# Patient Record
Sex: FEMALE | Race: WHITE | NOT HISPANIC OR LATINO | Employment: FULL TIME | URBAN - METROPOLITAN AREA
[De-identification: names, ages, dates, MRNs, and addresses within clinical notes are randomized per-mention and may not be internally consistent; named-entity substitution may affect disease eponyms.]

---

## 2020-02-10 ENCOUNTER — HOSPITAL ENCOUNTER (OUTPATIENT)
Dept: GENERAL RADIOLOGY | Facility: HOSPITAL | Age: 25
Discharge: HOME | End: 2020-02-10
Attending: OBSTETRICS & GYNECOLOGY

## 2020-09-25 ENCOUNTER — HOSPITAL ENCOUNTER (OUTPATIENT)
Dept: LABOR AND DELIVERY | Facility: HOSPITAL | Age: 25
Discharge: HOME | End: 2020-09-25
Attending: OBSTETRICS & GYNECOLOGY

## 2020-11-22 LAB
EXTERNAL GROUP B STREP ANTIGEN: POSITIVE
EXTERNAL VDRL: NONREACTIVE
HIV1 P24 AG SERPL QL IA: NEGATIVE

## 2021-01-22 LAB
C TRACH RRNA SPEC DONR QL NAA+PROBE: NORMAL
EXTERNAL ABO GROUPING: NORMAL
EXTERNAL ANTIBODY SCREEN: NORMAL
EXTERNAL HEMATOCRIT: 41 %
EXTERNAL HEMOGLOBIN: 13.5 G/DL
EXTERNAL HEPATITIS B SURFACE ANTIGEN: NEGATIVE
EXTERNAL PLATELET COUNT: 258 K/ΜL
EXTERNAL RH FACTOR: POSITIVE
EXTERNAL SYPHILIS RPR SCREEN: NEGATIVE
HCV AB S/CO SERPL IA: NORMAL
N GONORRHOEA DNA SPEC QL NAA+PROBE: NORMAL
RUBV IGG SERPL IA-ACNC: NORMAL

## 2021-05-20 ENCOUNTER — HOSPITAL ENCOUNTER (OUTPATIENT)
Dept: URGENT CARE | Facility: CLINIC | Age: 26
Discharge: HOME | End: 2021-05-20
Attending: EMERGENCY MEDICINE

## 2021-08-22 ENCOUNTER — HOSPITAL ENCOUNTER (OUTPATIENT)
Facility: HOSPITAL | Age: 26
Discharge: HOME | End: 2021-08-22
Attending: ADVANCED PRACTICE MIDWIFE | Admitting: ADVANCED PRACTICE MIDWIFE

## 2021-08-22 VITALS
TEMPERATURE: 98.3 F | DIASTOLIC BLOOD PRESSURE: 61 MMHG | HEART RATE: 92 BPM | RESPIRATION RATE: 18 BRPM | SYSTOLIC BLOOD PRESSURE: 121 MMHG

## 2021-08-22 LAB — A1 MICROGLOB PLACENTAL VAG QL: NEGATIVE

## 2021-08-22 PROCEDURE — 84112 EVAL AMNIOTIC FLUID PROTEIN: CPT | Performed by: ADVANCED PRACTICE MIDWIFE

## 2021-08-22 PROCEDURE — 59025 FETAL NON-STRESS TEST: CPT

## 2021-08-22 PROCEDURE — G0463 HOSPITAL OUTPT CLINIC VISIT: HCPCS

## 2021-08-22 RX ORDER — PRENATAL VIT/IRON FUM/FOLIC AC 27MG-0.8MG
1 TABLET ORAL DAILY
COMMUNITY

## 2021-08-22 NOTE — SIGNIFICANT NOTE
/61 (BP Location: Right arm, Patient Position: Sitting)   Pulse 92   Temp 98.3 °F (36.8 °C) (Oral)   Resp 18   35w4d  Cervix closed     08/22/21 1330   Uterine Activity Assessment   Method external tocotransducer   Contraction Pattern other (see comments)  (occasional)   Uterine Resting Tone soft by palpation   Uterine Tenderness No   Fetal Assessment   Fetal Movement active   Fetal HR Assessment Method external   Fetal HR (beats/min) 150   Fetal HR Variability moderate (amplitude range 6 to 25 bpm)   Fetal HR Accelerations lasting at least 15 seconds;greater than/equal to 15 bpm   Fetal HR Decelerations absent

## 2021-08-23 NOTE — PROGRESS NOTES
ANUJA Lawson  Triage Progress Note    Chief Complaint   Patient presents with   • Leaking Fluid       Subjective     Patient is a 26 y.o. female  currently at 35w4d, who presents with complaints of PSROM and occasional contractions.       Past OB History:     OB History    Para Term  AB Living   2 1 1 0 0 1   SAB TAB Ectopic Molar Multiple Live Births   0 0 0 0 0 1      # Outcome Date GA Lbr Jt/2nd Weight Sex Delivery Anes PTL Lv   2 Current            1 Term 20 37w0d       [facility]LEY        Objective       Vital Signs Range for the last 24 hours  Temperature: Temp:  [98.3 °F (36.8 °C)] 98.3 °F (36.8 °C)   Temp Source: Temp src: Oral   BP: BP: (121)/(61) 121/61   Pulse: Heart Rate:  [92] 92   Respirations: Resp:  [18] 18   SPO2:     O2 Amount (l/min):     O2 Devices     Weight:         Presentation:    Cervix: Exam by: Method: sterile exam per RN   Dilation: Cervical Dilation (cm): 0   Effacement: Cervical Effacement: 50%   Station:         Fetal Heart Rate Assessment   Method: Fetal HR Assessment Method: external   Beats/min: Fetal HR (beats/min): 150   Baseline:     Variability: Fetal HR Variability: moderate (amplitude range 6 to 25 bpm)   Accels: Fetal HR Accelerations: lasting at least 15 seconds, greater than/equal to 15 bpm   Decels: Fetal HR Decelerations: absent   Tracing Category:       Uterine Assessment   Method: Method: external tocotransducer   Frequency (min): Irregular   Ctx Count in 10 min:     Duration:     Intensity:     Intensity by IUPC:     Resting Tone: Uterine Resting Tone: soft by palpation   Resting Tone by IUPC:     [place] Units:       Laboratory Results:    Results for orders placed or performed during the hospital encounter of 21   Rapid Assay For ROM - Amniotic Fluid, Amniotic Sac    Specimen: Amniotic Sac; Amniotic Fluid   Result Value Ref Range    Rupture of Membranes Negative Negative              Assessment/Plan       Perceived  SROM  Contractions          Plan:  1. Discharge to home.  Reactive NST, irregular contractions, closed cervix without change, and negative ROM.  2. Plan of care has been reviewed with patient  3.  Risks, benefits of treatment plan have been discussed.  4.  All questions have been answered.        Martita Bradley CNM  8/22/2021  22:13 EDT

## 2021-09-02 ENCOUNTER — HOSPITAL ENCOUNTER (OUTPATIENT)
Facility: HOSPITAL | Age: 26
Discharge: HOME | End: 2021-09-02
Attending: ADVANCED PRACTICE MIDWIFE | Admitting: OBSTETRICS & GYNECOLOGY

## 2021-09-02 VITALS
SYSTOLIC BLOOD PRESSURE: 106 MMHG | HEART RATE: 101 BPM | DIASTOLIC BLOOD PRESSURE: 69 MMHG | RESPIRATION RATE: 18 BRPM | TEMPERATURE: 98.3 F

## 2021-09-02 PROCEDURE — G0463 HOSPITAL OUTPT CLINIC VISIT: HCPCS

## 2021-09-02 NOTE — SIGNIFICANT NOTE
09/02/21 1936   Nonstress Test   Reason for NST OB Triage;Other (Comment)  (Elevated blood pressure)   Acoustic Stimulator No   Uterine Irritability No   Contractions Irregular   Fetal Assessment   Fetal Movement absent   Fetal HR Assessment Method external   Fetal HR (beats/min) 140  (140's)   Fetal Heart Baseline Rate normal range   Fetal HR Variability moderate (amplitude range 6 to 25 bpm)   Fetal HR Accelerations episodic;lasting at least 15 seconds   Fetal HR Decelerations absent   Fetal HR Tracing Category Category I   Sinusoidal Pattern Present absent   Interpretation A   Nonstress Test Interpretation A Reactive

## 2021-09-02 NOTE — SIGNIFICANT NOTE
09/02/21 1926   Nonstress Test   Reason for NST OB Triage;Other (Comment)  (ELEVATED BLOOD PRESSURE AT HOME)   Acoustic Stimulator No   Uterine Irritability No   Contractions Irregular   Fetal Assessment   Fetal Movement active   Fetal HR Assessment Method external   Fetal HR (beats/min) 130   Fetal Heart Baseline Rate normal range   Fetal HR Variability moderate (amplitude range 6 to 25 bpm)   Fetal HR Accelerations greater than/equal to 15 bpm   Fetal HR Decelerations absent   Interpretation A   Nonstress Test Interpretation A Reactive   /69 (BP Location: Right arm, Patient Position: Sitting)   Pulse 101   Temp 98.3 °F (36.8 °C) (Oral)   Resp 18   37w1d

## 2021-09-08 ENCOUNTER — ROUTINE PRENATAL (OUTPATIENT)
Dept: OBSTETRICS AND GYNECOLOGY | Facility: CLINIC | Age: 26
End: 2021-09-08

## 2021-09-08 VITALS — WEIGHT: 271 LBS | DIASTOLIC BLOOD PRESSURE: 70 MMHG | SYSTOLIC BLOOD PRESSURE: 120 MMHG

## 2021-09-08 DIAGNOSIS — O36.63X0 MACROSOMIA OF FETUS AFFECTING MANAGEMENT OF MOTHER IN THIRD TRIMESTER, SINGLE OR UNSPECIFIED FETUS: ICD-10-CM

## 2021-09-08 DIAGNOSIS — Z3A.38 38 WEEKS GESTATION OF PREGNANCY: Primary | ICD-10-CM

## 2021-09-08 LAB
GLUCOSE UR STRIP-MCNC: NEGATIVE MG/DL
PROT UR STRIP-MCNC: NEGATIVE MG/DL

## 2021-09-08 PROCEDURE — 99212 OFFICE O/P EST SF 10 MIN: CPT | Performed by: OBSTETRICS & GYNECOLOGY

## 2021-09-08 NOTE — PROGRESS NOTES
OB FOLLOW UP    CC: Scheduled OB routine FU         Prenatal care complicated by: Elevated BMI and suspected macrosomia  There is no problem list on file for this patient.      Subjective:   Patient has: No complaints, No leaking fluid, No vaginal bleeding, No contractions, Adequate FM, No HA, No scotomata or vision changes, No RUQ/epigastric pain, No nausea, No vomiting, No back pain, No UTI symptoms  COMPLAINS OF: Swelling    Objective:  Urine glucose/protein- see flow sheet      /70   Wt 123 kg (271 lb)   See OB flow for LE edema, and cvx exam if applicable  FHT: 158 BPM   Uterine Size: 40 cm      Assessment and Plan:  Diagnoses and all orders for this visit:    1. 38 weeks gestation of pregnancy (Primary)  -     Cancel: POC Urinalysis Dipstick  -     POC Urinalysis Dipstick    2. Macrosomia of fetus affecting management of mother in third trimester, single or unspecified fetus  Comments:  Discussed IOL at 39+ weeks. Pt wishes to proceed    3. Obesity complicating childbirth  Comments:  Delivery at 39+ weeks          38w0d  Reassuring pregnancy progress    Counseling: OB precautions, leaking, VB, beth rai vs PTL/Labor, FKC, HTN precautions, HA, vision change, RUQ/epigastric pain, edema    Questions answered    Return in about 1 week (around 9/15/2021).      Dylan Deleon MD  09/08/2021

## 2021-09-10 ENCOUNTER — TRANSCRIBE ORDERS (OUTPATIENT)
Dept: LAB | Facility: HOSPITAL | Age: 26
End: 2021-09-10

## 2021-09-10 ENCOUNTER — LAB (OUTPATIENT)
Dept: LAB | Facility: HOSPITAL | Age: 26
End: 2021-09-10

## 2021-09-10 DIAGNOSIS — Z01.818 PREOP TESTING: ICD-10-CM

## 2021-09-10 DIAGNOSIS — Z01.818 PREOP TESTING: Primary | ICD-10-CM

## 2021-09-10 PROCEDURE — U0004 COV-19 TEST NON-CDC HGH THRU: HCPCS

## 2021-09-10 PROCEDURE — C9803 HOPD COVID-19 SPEC COLLECT: HCPCS

## 2021-09-11 LAB — SARS-COV-2 RNA NOSE QL NAA+PROBE: NOT DETECTED

## 2021-09-13 VITALS — BODY MASS INDEX: 43.74 KG/M2 | HEIGHT: 66 IN

## 2021-09-14 ENCOUNTER — ROUTINE PRENATAL (OUTPATIENT)
Dept: OBSTETRICS AND GYNECOLOGY | Facility: CLINIC | Age: 26
End: 2021-09-14

## 2021-09-14 VITALS — SYSTOLIC BLOOD PRESSURE: 124 MMHG | WEIGHT: 270 LBS | DIASTOLIC BLOOD PRESSURE: 84 MMHG | BODY MASS INDEX: 43.58 KG/M2

## 2021-09-14 DIAGNOSIS — O99.210 OBESITY IN PREGNANCY, ANTEPARTUM: ICD-10-CM

## 2021-09-14 DIAGNOSIS — O09.93 SUPERVISION OF HIGH RISK PREGNANCY IN THIRD TRIMESTER: Primary | ICD-10-CM

## 2021-09-14 DIAGNOSIS — O36.63X0 MACROSOMIA OF FETUS AFFECTING MANAGEMENT OF MOTHER IN THIRD TRIMESTER, SINGLE OR UNSPECIFIED FETUS: ICD-10-CM

## 2021-09-14 PROBLEM — Z34.80 SUPERVISION OF OTHER NORMAL PREGNANCY, ANTEPARTUM: Status: ACTIVE | Noted: 2021-09-14

## 2021-09-14 LAB
GLUCOSE UR STRIP-MCNC: NEGATIVE MG/DL
PROT UR STRIP-MCNC: NEGATIVE MG/DL

## 2021-09-14 PROCEDURE — 99212 OFFICE O/P EST SF 10 MIN: CPT | Performed by: OBSTETRICS & GYNECOLOGY

## 2021-09-14 RX ORDER — NORGESTIMATE AND ETHINYL ESTRADIOL 0.25-0.035
KIT ORAL
Status: ON HOLD | COMMUNITY
End: 2021-09-16

## 2021-09-14 NOTE — PROGRESS NOTES
OB FOLLOW UP    CC: Scheduled OB routine FU       Prenatal care complicated by: Elevated BMI and LGA  Patient Active Problem List   Diagnosis   • Supervision of other normal pregnancy, antepartum   • Macrosomia affecting management of mother in third trimester   • Obesity in pregnancy, antepartum       Subjective:   Patient has: No complaints, No leaking fluid, No vaginal bleeding, Adequate FM, No HA, No scotomata or vision changes, No RUQ/epigastric pain, No nausea, No vomiting, No back pain, No UTI symptoms, Complains of some irregular contractions and some lower extremity swelling  Desires to proceed with induction of labor on Thursday    Objective:  Urine glucose/protein- see flow sheet      /84   Wt 122 kg (270 lb)   BMI 43.58 kg/m²   See OB flow for LE edema, and cvx exam if applicable  FHT: 130 BPM   Uterine Size: 40.5 cm      Assessment and Plan:  Diagnoses and all orders for this visit:    1. Supervision of high risk pregnancy in third trimester (Primary)  Comments:  Labor instructions, fetal kick counts, OB warnings  Anticipatory guidance  Orders:  -     POC Urinalysis Dipstick    2. Macrosomia of fetus affecting management of mother in third trimester, single or unspecified fetus  Comments:  Estimated fetal weight is approximately 9 pounds by ultrasound 36 weeks  Plan induction of labor at 39 weeks  Dystocia risks discussed    3. Obesity in pregnancy, antepartum  Comments:  Continue to monitor weight        38w6d  Reassuring pregnancy progress    Counseling: OB precautions, leaking, VB, beth rai vs PTL/Labor, FKC  The risks, benefits, and alternatives of induction of labor have been discussed the patient, including the risk of failed induction of labor, an increased risk of  delivery, and increased risk of fetal heart rate problems during the induction that may lead to emergent  delivery.  We have discussed the risks of medication use for induction and augmentation of labor  including prostaglandins, such as Cytotec and other agents such as oxytocin.  We have discussed that drugs such as this have warnings for use in pregnancy, however, have long established safety and efficacy for induction of labor or augmentation of labor when used appropriately.  We have discussed the use of a cervical ripening balloon for induction of labor and/or cervical ripening.  We have discussed the risks, benefits, and alternatives to this method of induction of labor we've also discussed that [employer] College of obstetrics and gyne[employer] endorses the use of drugs such as these for induction of labor and augmentation of labor.  The patient expressed her understanding of these risks and wishes to proceed.  We have also discussed specifically the risk of shoulder dystocia given the suspected large size of the baby.  We discussed that shoulder dystocia could lead to permanent fetal and maternal injuries including fetal death.  We discussed the possible fetal injuries including brachial plexus injury, permanent disability, cerebral palsy etc.  We discussed that all shoulder dystocia not predictable.  The patient does wish to attempt vaginal delivery and has declined attempted primary  delivery.    Questions answered  Follow-up after delivery    Dylan Deleon MD  2021

## 2021-09-16 ENCOUNTER — HOSPITAL ENCOUNTER (INPATIENT)
Facility: HOSPITAL | Age: 26
LOS: 2 days | Discharge: HOME | End: 2021-09-18
Attending: OBSTETRICS & GYNECOLOGY | Admitting: OBSTETRICS & GYNECOLOGY

## 2021-09-16 ENCOUNTER — ANESTHESIA EVENT (OUTPATIENT)
Dept: LABOR AND DELIVERY | Facility: HOSPITAL | Age: 26
End: 2021-09-16

## 2021-09-16 ENCOUNTER — ANESTHESIA (OUTPATIENT)
Dept: LABOR AND DELIVERY | Facility: HOSPITAL | Age: 26
End: 2021-09-16

## 2021-09-16 ENCOUNTER — HOSPITAL ENCOUNTER (OUTPATIENT)
Dept: LABOR AND DELIVERY | Facility: HOSPITAL | Age: 26
Discharge: HOME | End: 2021-09-16

## 2021-09-16 PROBLEM — Z34.80 SUPERVISION OF OTHER NORMAL PREGNANCY, ANTEPARTUM: Status: RESOLVED | Noted: 2021-09-14 | Resolved: 2021-09-16

## 2021-09-16 PROBLEM — O99.820 GBS (GROUP B STREPTOCOCCUS CARRIER), +RV CULTURE, CURRENTLY PREGNANT: Status: ACTIVE | Noted: 2021-09-16

## 2021-09-16 PROBLEM — Z34.90 ENCOUNTER FOR INDUCTION OF LABOR: Status: ACTIVE | Noted: 2021-09-16

## 2021-09-16 LAB
ABO GROUP BLD: NORMAL
ABO GROUP BLD: NORMAL
BASE EXCESS BLDCOV CALC-SCNC: -3 MMOL/L
BASOPHILS # BLD AUTO: 0.02 10*3/MM3 (ref 0–0.2)
BASOPHILS NFR BLD AUTO: 0.2 % (ref 0–1.5)
BLD GP AB SCN SERPL QL: NEGATIVE
DEPRECATED RDW RBC AUTO: 43.3 FL (ref 37–54)
EOSINOPHIL # BLD AUTO: 0.16 10*3/MM3 (ref 0–0.4)
EOSINOPHIL NFR BLD AUTO: 1.9 % (ref 0.3–6.2)
ERYTHROCYTE [DISTWIDTH] IN BLOOD BY AUTOMATED COUNT: 13.7 % (ref 12.3–15.4)
HCO3 BLDCOV-SCNC: 23 MMOL/L
HCT VFR BLD AUTO: 34 % (ref 34–46.6)
HGB BLD-MCNC: 11.5 G/DL (ref 12–15.9)
IMM GRANULOCYTES # BLD AUTO: 0.05 10*3/MM3 (ref 0–0.05)
IMM GRANULOCYTES NFR BLD AUTO: 0.6 % (ref 0–0.5)
LYMPHOCYTES # BLD AUTO: 1.38 10*3/MM3 (ref 0.7–3.1)
LYMPHOCYTES NFR BLD AUTO: 16.5 % (ref 19.6–45.3)
MCH RBC QN AUTO: 29.6 PG (ref 26.6–33)
MCHC RBC AUTO-ENTMCNC: 33.8 G/DL (ref 31.5–35.7)
MCV RBC AUTO: 87.6 FL (ref 79–97)
MONOCYTES # BLD AUTO: 0.58 10*3/MM3 (ref 0.1–0.9)
MONOCYTES NFR BLD AUTO: 6.9 % (ref 5–12)
NEUTROPHILS NFR BLD AUTO: 6.19 10*3/MM3 (ref 1.7–7)
NEUTROPHILS NFR BLD AUTO: 73.9 % (ref 42.7–76)
NRBC BLD AUTO-RTO: 0 /100 WBC (ref 0–0.2)
PCO2 BLDCOV: 44.6 MM HG (ref 28–40)
PH BLDCOV: 7.33 PH UNITS (ref 7.31–7.37)
PLATELET # BLD AUTO: 173 10*3/MM3 (ref 140–450)
PMV BLD AUTO: 10.1 FL (ref 6–12)
PO2 BLDCOV: <40.5 MM HG (ref 21–31)
RBC # BLD AUTO: 3.88 10*6/MM3 (ref 3.77–5.28)
RH BLD: POSITIVE
RH BLD: POSITIVE
T&S EXPIRATION DATE: NORMAL
WBC # BLD AUTO: 8.38 10*3/MM3 (ref 3.4–10.8)

## 2021-09-16 PROCEDURE — 86900 BLOOD TYPING SEROLOGIC ABO: CPT | Performed by: OBSTETRICS & GYNECOLOGY

## 2021-09-16 PROCEDURE — 25010000002 FENTANYL CITRATE (PF) 50 MCG/ML SOLUTION: Performed by: STUDENT IN AN ORGANIZED HEALTH CARE EDUCATION/TRAINING PROGRAM

## 2021-09-16 PROCEDURE — 25010000002 PENICILLIN G POTASSIUM PER 600000 UNITS: Performed by: OBSTETRICS & GYNECOLOGY

## 2021-09-16 PROCEDURE — S0260 H&P FOR SURGERY: HCPCS | Performed by: OBSTETRICS & GYNECOLOGY

## 2021-09-16 PROCEDURE — C1755 CATHETER, INTRASPINAL: HCPCS | Performed by: STUDENT IN AN ORGANIZED HEALTH CARE EDUCATION/TRAINING PROGRAM

## 2021-09-16 PROCEDURE — 85025 COMPLETE CBC W/AUTO DIFF WBC: CPT | Performed by: OBSTETRICS & GYNECOLOGY

## 2021-09-16 PROCEDURE — 86901 BLOOD TYPING SEROLOGIC RH(D): CPT | Performed by: OBSTETRICS & GYNECOLOGY

## 2021-09-16 PROCEDURE — 51702 INSERT TEMP BLADDER CATH: CPT

## 2021-09-16 PROCEDURE — 25010000002 FENTANYL CITRATE (PF) 50 MCG/ML SOLUTION

## 2021-09-16 PROCEDURE — 86900 BLOOD TYPING SEROLOGIC ABO: CPT

## 2021-09-16 PROCEDURE — 51701 INSERT BLADDER CATHETER: CPT

## 2021-09-16 PROCEDURE — 51703 INSERT BLADDER CATH COMPLEX: CPT

## 2021-09-16 PROCEDURE — 59410 OBSTETRICAL CARE: CPT | Performed by: OBSTETRICS & GYNECOLOGY

## 2021-09-16 PROCEDURE — 0KQM0ZZ REPAIR PERINEUM MUSCLE, OPEN APPROACH: ICD-10-PCS | Performed by: OBSTETRICS & GYNECOLOGY

## 2021-09-16 PROCEDURE — 0UQJXZZ REPAIR CLITORIS, EXTERNAL APPROACH: ICD-10-PCS | Performed by: OBSTETRICS & GYNECOLOGY

## 2021-09-16 PROCEDURE — 82803 BLOOD GASES ANY COMBINATION: CPT | Performed by: OBSTETRICS & GYNECOLOGY

## 2021-09-16 PROCEDURE — 86850 RBC ANTIBODY SCREEN: CPT | Performed by: OBSTETRICS & GYNECOLOGY

## 2021-09-16 PROCEDURE — 86901 BLOOD TYPING SEROLOGIC RH(D): CPT

## 2021-09-16 PROCEDURE — 25010000002 ROPIVACAINE PER 1 MG

## 2021-09-16 RX ORDER — METOCLOPRAMIDE HYDROCHLORIDE 5 MG/ML
10 INJECTION INTRAMUSCULAR; INTRAVENOUS
Status: DISCONTINUED | OUTPATIENT
Start: 2021-09-16 | End: 2021-09-16 | Stop reason: HOSPADM

## 2021-09-16 RX ORDER — IBUPROFEN 600 MG/1
600 TABLET ORAL EVERY 6 HOURS PRN
Qty: 60 TABLET | Refills: 1 | Status: SHIPPED | OUTPATIENT
Start: 2021-09-16

## 2021-09-16 RX ORDER — LIDOCAINE HYDROCHLORIDE AND EPINEPHRINE 15; 5 MG/ML; UG/ML
INJECTION, SOLUTION EPIDURAL
Status: COMPLETED | OUTPATIENT
Start: 2021-09-16 | End: 2021-09-16

## 2021-09-16 RX ORDER — FAMOTIDINE 10 MG/ML
20 INJECTION, SOLUTION INTRAVENOUS
Status: DISCONTINUED | OUTPATIENT
Start: 2021-09-16 | End: 2021-09-16 | Stop reason: HOSPADM

## 2021-09-16 RX ORDER — FENTANYL CITRATE 50 UG/ML
INJECTION, SOLUTION INTRAMUSCULAR; INTRAVENOUS
Status: COMPLETED | OUTPATIENT
Start: 2021-09-16 | End: 2021-09-16

## 2021-09-16 RX ORDER — DOCUSATE SODIUM 100 MG/1
100 CAPSULE, LIQUID FILLED ORAL 2 TIMES DAILY
Qty: 60 CAPSULE | Refills: 1 | Status: SHIPPED | OUTPATIENT
Start: 2021-09-16 | End: 2021-10-26

## 2021-09-16 RX ORDER — MISOPROSTOL 200 UG/1
800 TABLET ORAL AS NEEDED
Status: DISCONTINUED | OUTPATIENT
Start: 2021-09-16 | End: 2021-09-16 | Stop reason: HOSPADM

## 2021-09-16 RX ORDER — ONDANSETRON 4 MG/1
4 TABLET, FILM COATED ORAL EVERY 6 HOURS PRN
Status: DISCONTINUED | OUTPATIENT
Start: 2021-09-16 | End: 2021-09-18 | Stop reason: HOSPADM

## 2021-09-16 RX ORDER — OXYTOCIN-SODIUM CHLORIDE 0.9% IV SOLN 30 UNIT/500ML 30-0.9/5 UT/ML-%
125 SOLUTION INTRAVENOUS ONCE
Status: COMPLETED | OUTPATIENT
Start: 2021-09-16 | End: 2021-09-16

## 2021-09-16 RX ORDER — TRISODIUM CITRATE DIHYDRATE AND CITRIC ACID MONOHYDRATE 500; 334 MG/5ML; MG/5ML
30 SOLUTION ORAL ONCE AS NEEDED
Status: DISCONTINUED | OUTPATIENT
Start: 2021-09-16 | End: 2021-09-16 | Stop reason: HOSPADM

## 2021-09-16 RX ORDER — ONDANSETRON 4 MG/1
4 TABLET, FILM COATED ORAL EVERY 6 HOURS PRN
Status: DISCONTINUED | OUTPATIENT
Start: 2021-09-16 | End: 2021-09-16 | Stop reason: HOSPADM

## 2021-09-16 RX ORDER — MORPHINE SULFATE 2 MG/ML
2 INJECTION, SOLUTION INTRAMUSCULAR; INTRAVENOUS
Status: DISCONTINUED | OUTPATIENT
Start: 2021-09-16 | End: 2021-09-16 | Stop reason: HOSPADM

## 2021-09-16 RX ORDER — HYDROCODONE BITARTRATE AND ACETAMINOPHEN 5; 325 MG/1; MG/1
1 TABLET ORAL EVERY 6 HOURS PRN
Qty: 8 TABLET | Refills: 0 | Status: SHIPPED | OUTPATIENT
Start: 2021-09-16 | End: 2021-10-26

## 2021-09-16 RX ORDER — SODIUM CHLORIDE 0.9 % (FLUSH) 0.9 %
1-10 SYRINGE (ML) INJECTION AS NEEDED
Status: DISCONTINUED | OUTPATIENT
Start: 2021-09-16 | End: 2021-09-18 | Stop reason: HOSPADM

## 2021-09-16 RX ORDER — SODIUM CHLORIDE 0.9 % (FLUSH) 0.9 %
10 SYRINGE (ML) INJECTION AS NEEDED
Status: DISCONTINUED | OUTPATIENT
Start: 2021-09-16 | End: 2021-09-16 | Stop reason: HOSPADM

## 2021-09-16 RX ORDER — IBUPROFEN 600 MG/1
600 TABLET ORAL EVERY 6 HOURS PRN
Status: DISCONTINUED | OUTPATIENT
Start: 2021-09-16 | End: 2021-09-18 | Stop reason: HOSPADM

## 2021-09-16 RX ORDER — BISACODYL 10 MG
10 SUPPOSITORY, RECTAL RECTAL DAILY PRN
Status: DISCONTINUED | OUTPATIENT
Start: 2021-09-17 | End: 2021-09-18 | Stop reason: HOSPADM

## 2021-09-16 RX ORDER — DOCUSATE SODIUM 100 MG/1
100 CAPSULE, LIQUID FILLED ORAL 2 TIMES DAILY
Status: DISCONTINUED | OUTPATIENT
Start: 2021-09-16 | End: 2021-09-18 | Stop reason: HOSPADM

## 2021-09-16 RX ORDER — TERBUTALINE SULFATE 1 MG/ML
0.25 INJECTION, SOLUTION SUBCUTANEOUS AS NEEDED
Status: DISCONTINUED | OUTPATIENT
Start: 2021-09-16 | End: 2021-09-16 | Stop reason: HOSPADM

## 2021-09-16 RX ORDER — OXYTOCIN-SODIUM CHLORIDE 0.9% IV SOLN 30 UNIT/500ML 30-0.9/5 UT/ML-%
125 SOLUTION INTRAVENOUS ONCE
Status: DISCONTINUED | OUTPATIENT
Start: 2021-09-16 | End: 2021-09-18 | Stop reason: HOSPADM

## 2021-09-16 RX ORDER — ONDANSETRON 2 MG/ML
4 INJECTION INTRAMUSCULAR; INTRAVENOUS EVERY 6 HOURS PRN
Status: DISCONTINUED | OUTPATIENT
Start: 2021-09-16 | End: 2021-09-16 | Stop reason: HOSPADM

## 2021-09-16 RX ORDER — FENTANYL CITRATE 50 UG/ML
INJECTION, SOLUTION INTRAMUSCULAR; INTRAVENOUS
Status: COMPLETED
Start: 2021-09-16 | End: 2021-09-16

## 2021-09-16 RX ORDER — HYDROCODONE BITARTRATE AND ACETAMINOPHEN 5; 325 MG/1; MG/1
2 TABLET ORAL EVERY 4 HOURS PRN
Status: DISCONTINUED | OUTPATIENT
Start: 2021-09-16 | End: 2021-09-16 | Stop reason: HOSPADM

## 2021-09-16 RX ORDER — SODIUM CHLORIDE, SODIUM LACTATE, POTASSIUM CHLORIDE, CALCIUM CHLORIDE 600; 310; 30; 20 MG/100ML; MG/100ML; MG/100ML; MG/100ML
125 INJECTION, SOLUTION INTRAVENOUS CONTINUOUS
Status: DISCONTINUED | OUTPATIENT
Start: 2021-09-16 | End: 2021-09-16

## 2021-09-16 RX ORDER — HYDROCODONE BITARTRATE AND ACETAMINOPHEN 5; 325 MG/1; MG/1
1 TABLET ORAL EVERY 4 HOURS PRN
Status: DISCONTINUED | OUTPATIENT
Start: 2021-09-16 | End: 2021-09-18 | Stop reason: HOSPADM

## 2021-09-16 RX ORDER — CEFAZOLIN SODIUM IN 0.9 % NACL 3 G/100 ML
3 INTRAVENOUS SOLUTION, PIGGYBACK (ML) INTRAVENOUS
Status: DISCONTINUED | OUTPATIENT
Start: 2021-09-16 | End: 2021-09-16 | Stop reason: HOSPADM

## 2021-09-16 RX ORDER — LIDOCAINE HYDROCHLORIDE 10 MG/ML
5 INJECTION, SOLUTION EPIDURAL; INFILTRATION; INTRACAUDAL; PERINEURAL AS NEEDED
Status: DISCONTINUED | OUTPATIENT
Start: 2021-09-16 | End: 2021-09-16 | Stop reason: HOSPADM

## 2021-09-16 RX ORDER — METHYLERGONOVINE MALEATE 0.2 MG/ML
200 INJECTION INTRAVENOUS ONCE AS NEEDED
Status: DISCONTINUED | OUTPATIENT
Start: 2021-09-16 | End: 2021-09-16 | Stop reason: HOSPADM

## 2021-09-16 RX ORDER — TRISODIUM CITRATE DIHYDRATE AND CITRIC ACID MONOHYDRATE 500; 334 MG/5ML; MG/5ML
30 SOLUTION ORAL
Status: DISCONTINUED | OUTPATIENT
Start: 2021-09-16 | End: 2021-09-16 | Stop reason: HOSPADM

## 2021-09-16 RX ORDER — IBUPROFEN 600 MG/1
600 TABLET ORAL EVERY 6 HOURS PRN
Status: DISCONTINUED | OUTPATIENT
Start: 2021-09-16 | End: 2021-09-16 | Stop reason: HOSPADM

## 2021-09-16 RX ORDER — CALCIUM CARBONATE 200(500)MG
2 TABLET,CHEWABLE ORAL 3 TIMES DAILY PRN
Status: DISCONTINUED | OUTPATIENT
Start: 2021-09-16 | End: 2021-09-18 | Stop reason: HOSPADM

## 2021-09-16 RX ORDER — HYDROCODONE BITARTRATE AND ACETAMINOPHEN 5; 325 MG/1; MG/1
1 TABLET ORAL EVERY 4 HOURS PRN
Status: DISCONTINUED | OUTPATIENT
Start: 2021-09-16 | End: 2021-09-16 | Stop reason: HOSPADM

## 2021-09-16 RX ORDER — HYDROCODONE BITARTRATE AND ACETAMINOPHEN 5; 325 MG/1; MG/1
2 TABLET ORAL EVERY 4 HOURS PRN
Status: DISCONTINUED | OUTPATIENT
Start: 2021-09-16 | End: 2021-09-18 | Stop reason: HOSPADM

## 2021-09-16 RX ORDER — ACETAMINOPHEN 325 MG/1
650 TABLET ORAL EVERY 4 HOURS PRN
Status: DISCONTINUED | OUTPATIENT
Start: 2021-09-16 | End: 2021-09-16 | Stop reason: HOSPADM

## 2021-09-16 RX ORDER — CARBOPROST TROMETHAMINE 250 UG/ML
250 INJECTION, SOLUTION INTRAMUSCULAR AS NEEDED
Status: DISCONTINUED | OUTPATIENT
Start: 2021-09-16 | End: 2021-09-16 | Stop reason: HOSPADM

## 2021-09-16 RX ORDER — PRENATAL WITH FERROUS FUM AND FOLIC ACID 3080; 920; 120; 400; 22; 1.84; 3; 20; 10; 1; 12; 200; 27; 25; 2 [IU]/1; [IU]/1; MG/1; [IU]/1; MG/1; MG/1; MG/1; MG/1; MG/1; MG/1; UG/1; MG/1; MG/1; MG/1; MG/1
1 TABLET ORAL DAILY
Status: DISCONTINUED | OUTPATIENT
Start: 2021-09-17 | End: 2021-09-18 | Stop reason: HOSPADM

## 2021-09-16 RX ORDER — SODIUM CHLORIDE 0.9 % (FLUSH) 0.9 %
3 SYRINGE (ML) INJECTION EVERY 12 HOURS SCHEDULED
Status: DISCONTINUED | OUTPATIENT
Start: 2021-09-16 | End: 2021-09-16 | Stop reason: HOSPADM

## 2021-09-16 RX ORDER — OXYTOCIN-SODIUM CHLORIDE 0.9% IV SOLN 30 UNIT/500ML 30-0.9/5 UT/ML-%
2-20 SOLUTION INTRAVENOUS
Status: DISCONTINUED | OUTPATIENT
Start: 2021-09-16 | End: 2021-09-16 | Stop reason: HOSPADM

## 2021-09-16 RX ORDER — EPHEDRINE SULFATE 50 MG/ML
5 INJECTION, SOLUTION INTRAVENOUS
Status: DISCONTINUED | OUTPATIENT
Start: 2021-09-16 | End: 2021-09-16 | Stop reason: HOSPADM

## 2021-09-16 RX ADMIN — FENTANYL CITRATE 100 MCG: 50 INJECTION INTRAMUSCULAR; INTRAVENOUS at 13:47

## 2021-09-16 RX ADMIN — PENICILLIN G POTASSIUM 2.5 MILLION UNITS: 5000000 INJECTION, POWDER, FOR SOLUTION INTRAMUSCULAR; INTRAVENOUS at 14:00

## 2021-09-16 RX ADMIN — PENICILLIN G POTASSIUM 5 MILLION UNITS: 5000000 INJECTION, POWDER, FOR SOLUTION INTRAMUSCULAR; INTRAVENOUS at 09:27

## 2021-09-16 RX ADMIN — IBUPROFEN 600 MG: 600 TABLET ORAL at 23:10

## 2021-09-16 RX ADMIN — SODIUM CHLORIDE, POTASSIUM CHLORIDE, SODIUM LACTATE AND CALCIUM CHLORIDE 125 ML/HR: 600; 310; 30; 20 INJECTION, SOLUTION INTRAVENOUS at 08:43

## 2021-09-16 RX ADMIN — EPHEDRINE SULFATE 5 MG: 50 INJECTION INTRAVENOUS at 14:56

## 2021-09-16 RX ADMIN — WITCH HAZEL 1 PAD: 500 SOLUTION RECTAL; TOPICAL at 20:05

## 2021-09-16 RX ADMIN — DOCUSATE SODIUM 100 MG: 100 CAPSULE, LIQUID FILLED ORAL at 21:36

## 2021-09-16 RX ADMIN — Medication: at 20:05

## 2021-09-16 RX ADMIN — OXYTOCIN 2 MILLI-UNITS/MIN: 10 INJECTION, SOLUTION INTRAMUSCULAR; INTRAVENOUS at 08:43

## 2021-09-16 RX ADMIN — OXYTOCIN 125 ML/HR: 10 INJECTION, SOLUTION INTRAMUSCULAR; INTRAVENOUS at 16:50

## 2021-09-16 RX ADMIN — LIDOCAINE HYDROCHLORIDE AND EPINEPHRINE 3 ML: 15; 5 INJECTION, SOLUTION EPIDURAL at 13:47

## 2021-09-16 RX ADMIN — SODIUM CHLORIDE, POTASSIUM CHLORIDE, SODIUM LACTATE AND CALCIUM CHLORIDE 125 ML/HR: 600; 310; 30; 20 INJECTION, SOLUTION INTRAVENOUS at 12:07

## 2021-09-16 NOTE — ANESTHESIA PROCEDURE NOTES
Labor Epidural      Patient reassessed immediately prior to procedure    Patient location during procedure: OB  Start Time: 9/16/2021 1:05 PM  Stop Time: 9/16/2021 1:50 PM  Performed By  Anesthesiologist: Elinor Da Silva DO  Preanesthetic Checklist  Completed: patient identified, IV checked, risks and benefits discussed, surgical consent, monitors and equipment checked, pre-op evaluation and timeout performed  Additional Notes  Landmarks were difficult to determine due to patient's body habitus. Several attempts made. I asked a second provider, Dr. Goldberg, to help, who ultimately successfully placed the epidural.   Prep:  Pt Position:sitting  Sterile Tech:cap, gloves, sterile barrier, mask and gown  Prep:chlorhexidine gluconate and isopropyl alcohol  Monitoring:blood pressure monitoring, continuous pulse oximetry and EKG  Epidural Block Procedure:  Approach:midline  Guidance:landmark technique and palpation technique  Location:L3-L4  Needle Type:Tuohy  Needle Gauge:17 G  Loss of Resistance Medium: air  Loss of Resistance: 12cm  Cath Depth at skin:10 cm  Paresthesia: none  Aspiration:negative  Test Dose:negative  Test dose medication: fentaNYL citrate (PF) (SUBLIMAZE) injection, 100 mcg  lidocaine 1.5%-EPINEPHrine 1:200,000 (XYLOCAINE W/EPI) injection, 3 mL  Med administered at 9/16/2021 1:47 PM  Number of Attempts: 1 (5)  Post Assessment:  Dressing:occlusive dressing applied and secured with tape  Pt Tolerance:patient tolerated the procedure well with no apparent complications  Complications:no

## 2021-09-16 NOTE — DISCHARGE SUMMARY
[facility]         DISCHARGE SUMMARY    Patient Name: Solange Guzmán  : 1995  MRN: [id number]    Date of Admission: 2021  Date of Discharge: 2021   Primary Care Physician: Raymundo David MD    Consults     No orders found from 2021 to 2021.           Procedures:  Spontaneous vaginal delivery    Presenting Problem:   Macrosomia of fetus affecting management of mother in third trimester, single or unspecified fetus [O36.63X0]  Spontaneous vaginal delivery [O80]    Admitting Diagnosis:  26-year-old  2 para 1 at 39 weeks and 1 days gestation  Suspected macrosomia  Obesity  GBS positive  Induction of labor    Discharge Diagnosis:  26-year-old  2 para 1 at 39 weeks and 1 days gestation  Suspected macrosomia  Obesity  GBS positive  Induction of labor  Spontaneous vaginal delivery  Second-degree midline laceration and periclitoral laceration    Delivery Summary     OB Surgeon: Dylan Deleon MD   Anesthesia: Epidural  Delivery Type:   Perineum: OBPERINEUM: 2nd degree laceration, Periclitoral laceration  Feeding method: Breast    Infant: female  infant;    Weight: 4190 g (9 lb 3.8 oz)     APGARS: 8  @ 1 minute / 9  @ 5 minutes   Venous Blood Gas:   pH, Cord Venous   Date Value Ref Range Status   2021 7.330 7.310 - 7.370 pH Units Final      Arterial Blood Gas: No results found for: [facility]     Hospital Course     Hospital Course:  Solange Guzmán is a 26 y.o.  39w1d who presented on 2021 for induction of labor secondary to maternal obesity and and suspected macrosomia.  Her induction of labor was carried out with low-dose oxytocin and a cervical ripening balloon.  Her induction proceeded well.  She had an uncomplicated vaginal delivery on 2021.  After initial recovery in the PACU she was transferred to postpartum unit for further postpartum care.  She did well postpartum by postpartum day 2-day of discharge, she was afebrile  stable vital signs throughout the hospital stay.  She was tolerating a regular diet.  She was ambulating without difficulty.  She had normal bowel function.  Her pain was controlled.  She was breast-feeding her infant without difficulty.  Infant was doing well.  She desired discharge home. Discharge Saturday, post partum day 2    Day of Discharge     Vital Signs:  Temp:  [97.7 °F (36.5 °C)-98.7 °F (37.1 °C)] 97.7 °F (36.5 °C)  Heart Rate:  [] 183  Resp:  [18] 18  BP: ()/() 93/36    Pertinent  and/or Most Recent Results     LAB RESULTS:       Lab 09/16/21  1100   WBC 8.38   HEMOGLOBIN 11.5*   HEMATOCRIT 34.0   PLATELETS 173   NEUTROS ABS 6.19   IMMATURE GRANS (ABS) 0.05   LYMPHS ABS 1.38   MONOS ABS 0.58   EOS ABS 0.16   MCV 87.6                 Lab 09/16/21  0950 09/16/21  0844 09/16/21  0844   ABO TYPING A   < > A   RH TYPING Positive   < > Positive   ANTIBODY SCREEN  --   --  Negative    < > = values in this interval not displayed.     URINALYSIS@  Microbiology Results (last 10 days)     Procedure Component Value - Date/Time    COVID PRE-OP / PRE-PROCEDURE SCREENING ORDER (NO ISOLATION) - Swab, Nasopharynx [[id number]] Collected: 09/10/21 1140    Lab Status: Final result Specimen: Swab from Nasopharynx Updated: 09/11/21 1800    Narrative:      The following orders were created for panel order COVID PRE-OP / PRE-PROCEDURE SCREENING ORDER (NO ISOLATION) - Swab, Nasopharynx.  Procedure                               Abnormality         Status                     ---------                               -----------         ------                     COVID-19 PCR, [facility]...[[id number]]                      Final result                 Please view results for these tests on the individual orders.    COVID-19 PCR, [facility], NP SWAB IN [facility] VIRAL TRANSPORT MEDIA/ORAL SWISH 24-30 HR TAT - Swab, Nasopharynx [[id number]] Collected: 09/10/21 1140    Lab Status: Final result Specimen: Swab from Nasopharynx  Updated: 09/11/21 1800     SARS-CoV-2 MYRNA Not Detected             Discharge Details        Discharge Medications      New Medications      Instructions Start Date   docusate sodium 100 MG capsule  Commonly known as: Colace   100 mg, Oral, 2 Times Daily      HYDROcodone-acetaminophen 5-325 MG per tablet  Commonly known as: NORCO   1 tablet, Oral, Every 6 Hours PRN      ibuprofen 600 MG tablet  Commonly known as: ADVIL,MOTRIN   600 mg, Oral, Every 6 Hours PRN         Continue These Medications      Instructions Start Date   prenatal vitamin 27-0.8 27-0.8 MG tablet tablet   1 tablet, Oral, Daily             No Known Allergies    Discharge Disposition:   Home, self-care    Discharge Condition:  Good    Diet:   Regular    Discharge Activity:   Gradually resume normal activity  Pelvic rest, nothing in the vagina, no tampons, no douching, and no intercourse for 6 weeks.    Follow Up:  5 weeks with Dr. Deleon    Electronically signed by Dylan Deleon MD, 09/16/21, 6:07 PM EDT.

## 2021-09-16 NOTE — ANESTHESIA PREPROCEDURE EVALUATION
Anesthesia Evaluation     Patient summary reviewed and Nursing notes reviewed   no history of anesthetic complications:  NPO Solid Status: > 8 hours  NPO Liquid Status: > 2 hours           Airway   Mallampati: II  TM distance: >3 FB  Neck ROM: full  Possible difficult intubation  Dental - normal exam     Pulmonary - negative pulmonary ROS and normal exam   (-) not a smoker  Cardiovascular - negative cardio ROS and normal exam  Exercise tolerance: good (4-7 METS)        Neuro/Psych  (+) numbness,       ROS Comment: Ulnar nerve repair  GI/Hepatic/Renal/Endo    (+) obesity, morbid obesity, GERD well controlled,      Musculoskeletal (-) negative ROS        ROS comment: Right upper extremity pain  Abdominal   (+) obese,     Bowel sounds: normal.   Substance History - negative use     OB/GYN    (+) Pregnant,         Other - negative ROS       ROS/Med Hx Other: macrosomic fetus      Phys Exam Other: Gravid uterus                Anesthesia Plan    ASA 3     epidural       Anesthetic plan, all risks, benefits, and alternatives have been provided, discussed and informed consent has been obtained with: patient.

## 2021-09-16 NOTE — PLAN OF CARE
Problem: Adult Inpatient Plan of Care  Goal: Plan of Care Review  Outcome: Ongoing, Progressing  Flowsheets (Taken 9/16/2021 8078)  Progress: improving  Plan of Care Reviewed With:   patient   spouse  Goal: Patient-Specific Goal (Individualized)  Outcome: Ongoing, Progressing  Goal: Absence of Hospital-Acquired Illness or Injury  Outcome: Ongoing, Progressing  Goal: Optimal Comfort and Wellbeing  Outcome: Ongoing, Progressing  Goal: Readiness for Transition of Care  Outcome: Ongoing, Progressing  Intervention: Mutually Develop Transition Plan  Recent Flowsheet Documentation  Taken 9/16/2021 0813 by Grisel Hassan, RN  Equipment Currently Used at Home: none  Taken 9/16/2021 0812 by Grisel Hassan, RN  Transportation Anticipated: car, drives self  Patient/Family Anticipated Services at Transition: none  Patient/Family Anticipates Transition to: home     Problem: Bleeding (Labor)  Goal: Hemostasis  Outcome: Ongoing, Progressing     Problem: Change in Fetal Wellbeing (Labor)  Goal: Stable Fetal Wellbeing  Outcome: Ongoing, Progressing     Problem: Delayed Labor Progression (Labor)  Goal: Effective Progression to Delivery  Outcome: Ongoing, Progressing     Problem: Infection (Labor)  Goal: Absence of Infection Signs and Symptoms  Outcome: Ongoing, Progressing     Problem: Labor Pain (Labor)  Goal: Acceptable Pain Control  Outcome: Ongoing, Progressing     Problem: Uterine Tachysystole (Labor)  Goal: Normal Uterine Contraction Pattern  Outcome: Ongoing, Progressing   Goal Outcome Evaluation:  Plan of Care Reviewed With: patient, spouse        Progress: improving

## 2021-09-16 NOTE — PROGRESS NOTES
ANUJA Lawson  Obstetric Intrapartum Progress Note    Subjective:  Now comfortable with epidural    Objective:  Temp:  [98.7 °F (37.1 °C)] 98.7 °F (37.1 °C)  Heart Rate:  [68-86] 82  BP: ()/(55-75) 120/64         Intake/Output last 24 hours:  No intake or output data in the 24 hours ending 09/16/21 1407    Intake/Output this shift:  No intake/output data recorded.    FHR Tracing:  Baseline: 120  Variability:   Moderate  Accelerations: Present (32 weeks+) 15 x 15 bpm  Decelerations: Absent  Contractions:  Regular, q3min    Physical Exam:  General appearance: alert and in no distress  Cervix: Dilation: 5cm              Effacement: 70%              Station: -2              Presentation: cephalic   AROM with clear return of fluid without difficulty    Assessment:    Obesity in pregnancy, antepartum    Macrosomia affecting management of mother in third trimester    Encounter for induction of labor    GBS (group B Streptococcus carrier), +RV culture, currently pregnant    Category I  Reassuring fetus, Adequate progress, AROM, Clear fluid    Plan:  Continue to monitor, Active labor positioning and Reviewed course/progress/plan with pt, questions answered to her satisfaction, she desires to proceed as outlined.    Dylan Deleon MD 9/16/2021 14:07 EDT

## 2021-09-16 NOTE — PROGRESS NOTES
ANUJA Lawson  Obstetric Intrapartum Progress Note    Subjective:  No problems    Objective:            Intake/Output last 24 hours:  No intake or output data in the 24 hours ending 09/16/21 0933    Intake/Output this shift:  No intake/output data recorded.    FHR Tracing:  Baseline: 140  Variability:   Moderate  Accelerations: Present (32 weeks+) 15 x 15 bpm  Decelerations: Absent  Contractions:  Irregular     Cook cervical ripening balloon placed without difficulty.  Intrauterine balloon only inflated with 60 mL    Physical Exam:  General appearance: alert and in no distress  Cervix: Dilation: 1cm              Effacement: 50%              Station: -3              Presentation: cephalic    Assessment:    Macrosomia affecting management of mother in third trimester    Category I  IOL started, Cervical cath placed    Plan:  Continue pitocin, Continue to monitor, Active labor positioning and Reviewed course/progress/plan with pt, questions answered to her satisfaction, she desires to proceed as outlined.    Dylan Deleon MD 9/16/2021 09:33 EDT

## 2021-09-16 NOTE — L&D DELIVERY NOTE
VAGINAL DELIVERY NOTE          Delivery Date: 9/16/2021   Delivery Time: 4:27 PM   Delivery Type: Spontaneous vaginal  Gestational Age: 39w1d  Delivery Provider: Dylan Deleon     Anesthesia: Epidural    Infant: female  infant   4190 g (9 lb 3.8 oz)   APGARS: 8  @ 1 minute / 9  @ 5 minutes  Shoulder Dystocia: No      Placenta, Cord, and Fluid    Placenta Delivered:  Manual removal  at   9/16/2021  4:45 PM     Cord: 3 vessels  present.   Nuchal Cord:  no   Cord Blood Obtained: Yes    Cord Gases Obtained:  Yes    Cord Gas Results: Venous:  No results found for: PHCVEN    Arterial:  No results found for: PHCART       Perineum: OBPERINEUM: 2nd degree laceration, And periclitoral laceration    EBL: Est. Blood Loss (mL): 400 mL (Filed from Delivery Summary) (09/16/21 7863)    Complications: None    Description of Procedure: I was called to the bedside after the patient was found to be complete, complete, +2 station and pushing well. With the next pushes the patient progressed to a +5 station, and delivered a live viable female infant at 4:27 PM over a second-degree midline laceration. The baby delivered in the right occiput anterior presentation, with the anterior shoulder being delivered first. At no time was there a shoulder dystocia, and at no time was any fundal pressure given. After complete delivery, the mouth and nose were bulb suctioned, the cord was doubly clamped and the baby was placed on the mother's abdomen, with a good cry. The father the baby cut the umbilical cord. Apgars were 8 and 9 at 1 and 5 minutes. The birth weight was 9 pounds 4 ounces. Cord blood and gases were collected. The placenta did not deliver within 20 minutes of delivery of the baby.  The placenta did not feel as though it was close to delivery.  I gently inserted a hand along the cord and to leave the placenta away from the uterine wall intact.  I made a second sweep of the uterus once the placenta was out to ensure no remaining  placental tissue was present.  None was found.  There was a 3 vessel umbilical cord present. The vagina and cervix were inspected and there was a second-degree midline laceration and a periclitoral laceration.  The periclitoral laceration was bleeding pretty briskly.  The periclitoral laceration was repaired with 3-0 Vicryl suture in a running fashion.. Uterine tone was good, and lochia was scant. EBL was 400 mL. Both mother and  are recovering in excellent condition in the labor room. All counts were correct x 2 prior to my exit of the room    Electronically signed by Dylan Deleon MD, 21, 5:09 PM EDT.

## 2021-09-17 LAB
HCT VFR BLD AUTO: 29.5 % (ref 34–46.6)
HGB BLD-MCNC: 10.1 G/DL (ref 12–15.9)

## 2021-09-17 PROCEDURE — 85018 HEMOGLOBIN: CPT | Performed by: OBSTETRICS & GYNECOLOGY

## 2021-09-17 PROCEDURE — 0503F POSTPARTUM CARE VISIT: CPT | Performed by: OBSTETRICS & GYNECOLOGY

## 2021-09-17 PROCEDURE — 85014 HEMATOCRIT: CPT | Performed by: OBSTETRICS & GYNECOLOGY

## 2021-09-17 RX ADMIN — DOCUSATE SODIUM 100 MG: 100 CAPSULE, LIQUID FILLED ORAL at 21:02

## 2021-09-17 RX ADMIN — PRENATAL WITH FERROUS FUM AND FOLIC ACID 1 TABLET: 3080; 920; 120; 400; 22; 1.84; 3; 20; 10; 1; 12; 200; 27; 25; 2 TABLET ORAL at 09:34

## 2021-09-17 RX ADMIN — DOCUSATE SODIUM 100 MG: 100 CAPSULE, LIQUID FILLED ORAL at 09:34

## 2021-09-17 RX ADMIN — IBUPROFEN 600 MG: 600 TABLET ORAL at 12:07

## 2021-09-17 RX ADMIN — IBUPROFEN 600 MG: 600 TABLET ORAL at 05:14

## 2021-09-17 RX ADMIN — IBUPROFEN 600 MG: 600 TABLET ORAL at 17:47

## 2021-09-17 NOTE — LACTATION NOTE
LC in to see this patient and her second baby. She has been supplementing often because baby had some low blood sugars. LC in to assist /assess this feeding and noted that baby was somewhat sleepy but mom showed good waking skills and latching skills. Baby was held in the cradle hold to the left breast and baby latched deeply and had good swallows. LC provided some syringes and instructed her on use if more supplementation is needed. LC discussed with mom about  normal  breastfeeding behaviors and breastfeeding expectations for the next 2 days and to call as needed for lactation assistance . Mom showed good understanding.

## 2021-09-17 NOTE — PROGRESS NOTES
"PostPartum/PostOp PROGRESS NOTE      Subjective:  Patient has no complaints  Pain controlled  Tolerating a regular diet  Passing flatus  Ambulating  Urinating spontaneously  Lochia decreasing, no bleeding concerns  Denies HA, vision change, or RUQ/epigastric pain  No lightheadedness or dizziness    Objective:  Vitals: Blood pressure 113/56, pulse 63, temperature 97.7 °F (36.5 °C), temperature source Oral, resp. rate 18, height 167.6 cm (65.98\"), weight 122 kg (270 lb), SpO2 100 %, currently breastfeeding.          General: NAD, alert and oriented, appropriate  Psych: Normal mood, appropriate  Abdomen: Soft, non distended, non tender, normal active bowel sounds  Extremeties: +1 edema    Labs:  Lab Results (last 24 hours)     Procedure Component Value Units Date/Time    CBC & Differential [[id number]]  (Abnormal) Collected: 09/16/21 0844    Specimen: Blood Updated: 09/16/21 1113    Narrative:      The following orders were created for panel order CBC & Differential.  Procedure                               Abnormality         Status                     ---------                               -----------         ------                     CBC Auto Differential[[id number]]        Abnormal            Final result               Scan Slide[[id number]]                                                                    Please view results for these tests on the individual orders.    CBC Auto Differential [[id number]]  (Abnormal) Collected: 09/16/21 1100    Specimen: Blood Updated: 09/16/21 1113     WBC 8.38 10*3/mm3      RBC 3.88 10*6/mm3      Hemoglobin 11.5 g/dL      Hematocrit 34.0 %      MCV 87.6 fL      MCH 29.6 pg      MCHC 33.8 g/dL      RDW 13.7 %      RDW-SD 43.3 fl      MPV 10.1 fL      Platelets 173 10*3/mm3      Neutrophil % 73.9 %      Lymphocyte % 16.5 %      Monocyte % 6.9 %      Eosinophil % 1.9 %      Basophil % 0.2 %      Immature Grans % 0.6 %      Neutrophils, Absolute 6.19 10*3/mm3      Lymphocytes, Absolute " 1.38 10*3/mm3      Monocytes, Absolute 0.58 10*3/mm3      Eosinophils, Absolute 0.16 10*3/mm3      Basophils, Absolute 0.02 10*3/mm3      Immature Grans, Absolute 0.05 10*3/mm3      nRBC 0.0 /100 WBC     Blood Gas, Arterial, Cord [[id number]] Collected: 21    Specimen: Cord Blood Arterial from Umbilical Cord Updated: 21    Blood Gas, Venous, Cord [[id number]]  (Abnormal) Collected: 21    Specimen: Cord Blood Venous from Umbilical Cord Updated: 21     pH, Cord Venous 7.330 pH Units      pCO2, Cord Venous 44.6 mm Hg      pO2, Cord Venous <40.5 mm Hg      Base Excess, Cord Venous -3.0 mmol/L      HCO3, Cord Venous 23.0 mmol/L     Hemoglobin & Hematocrit, Blood [[id number]]  (Abnormal) Collected: 21    Specimen: Blood Updated: 21 043     Hemoglobin 10.1 g/dL      Hematocrit 29.5 %             Assessment:    Post-partum/postop Day:  1  Status post     Plan:   Routine postpartum/postop care  Ambulate, Shower, Sitz baths, PO pain meds, Importance of wound care/keep clean and dry, Breast feeding support, DC meds reviewed, Follow up scheduled, PP/PO precautions given  D/C home if baby is also discharged, otherwise anticipate discharge in am    Electronically signed by Dylan Deleon MD, 21, 8:09 AM EDT.

## 2021-09-17 NOTE — PLAN OF CARE
Problem: Adult Inpatient Plan of Care  Goal: Plan of Care Review  Outcome: Ongoing, Progressing  Goal: Patient-Specific Goal (Individualized)  Outcome: Ongoing, Progressing  Goal: Absence of Hospital-Acquired Illness or Injury  Outcome: Ongoing, Progressing  Intervention: Prevent and Manage VTE (venous thromboembolism) Risk  Intervention: Prevent Infection  Goal: Optimal Comfort and Wellbeing  Outcome: Ongoing, Progressing  Intervention: Provide Person-Centered Care  Goal: Readiness for Transition of Care  Outcome: Ongoing, Progressing     Problem: Adjustment to Role Transition (Postpartum Vaginal Delivery)  Goal: Successful Maternal Role Transition  Outcome: Ongoing, Progressing  Intervention: Support Maternal Role Transition     Problem: Bleeding (Postpartum Vaginal Delivery)  Goal: Hemostasis  Outcome: Ongoing, Progressing     Problem: Infection (Postpartum Vaginal Delivery)  Goal: Absence of Infection Signs and Symptoms  Outcome: Ongoing, Progressing     Problem: Pain (Postpartum Vaginal Delivery)  Goal: Acceptable Pain Control  Outcome: Ongoing, Progressing     Problem: Urinary Retention (Postpartum Vaginal Delivery)  Goal: Effective Urinary Elimination  Outcome: Ongoing, Progressing  Intervention: Promote Effective Urinary Elimination     Problem: Breastfeeding  Goal: Effective Breastfeeding  Outcome: Ongoing, Progressing  Intervention: Promote Effective Breastfeeding  Intervention: Support Exclusive Breastfeeding Success   Goal Outcome Evaluation:            Progressing as expected

## 2021-09-17 NOTE — ANESTHESIA POSTPROCEDURE EVALUATION
Patient: Solange Guzmán    Procedure Summary     Date: 09/16/21 Room / Location:     Anesthesia Start: 1305 Anesthesia Stop: 1627    Procedure: LABOR ANALGESIA Diagnosis:     Scheduled Providers:  Provider: Elinor Da Silva DO    Anesthesia Type: epidural ASA Status: 3          Anesthesia Type: epidural    Vitals  Vitals Value Taken Time   /56 09/17/21 0516   Temp 36.5 °C (97.7 °F) 09/17/21 0516   Pulse 63 09/17/21 0516   Resp 18 09/17/21 0516   SpO2 100 % 09/16/21 1900           Post Anesthesia Care and Evaluation    Patient location during evaluation: bedside  Patient participation: complete - patient participated  Level of consciousness: awake  Pain score: 0  Pain management: adequate  Airway patency: patent  Anesthetic complications: No anesthetic complications  PONV Status: none  Cardiovascular status: acceptable and stable  Respiratory status: acceptable and room air  Hydration status: acceptable  Post Neuraxial Block status: Motor and sensory function returned to baseline and No signs or symptoms of PDPH

## 2021-09-18 VITALS
DIASTOLIC BLOOD PRESSURE: 62 MMHG | HEIGHT: 66 IN | WEIGHT: 270 LBS | OXYGEN SATURATION: 100 % | HEART RATE: 69 BPM | TEMPERATURE: 98.1 F | RESPIRATION RATE: 18 BRPM | SYSTOLIC BLOOD PRESSURE: 103 MMHG | BODY MASS INDEX: 43.39 KG/M2

## 2021-09-18 PROCEDURE — 0503F POSTPARTUM CARE VISIT: CPT | Performed by: OBSTETRICS & GYNECOLOGY

## 2021-09-18 RX ORDER — MISOPROSTOL 200 UG/1
200 TABLET ORAL
Status: DISCONTINUED | OUTPATIENT
Start: 2021-09-18 | End: 2021-09-18 | Stop reason: HOSPADM

## 2021-09-18 RX ADMIN — IBUPROFEN 600 MG: 600 TABLET ORAL at 11:03

## 2021-09-18 RX ADMIN — DOCUSATE SODIUM 100 MG: 100 CAPSULE, LIQUID FILLED ORAL at 08:24

## 2021-09-18 RX ADMIN — IBUPROFEN 600 MG: 600 TABLET ORAL at 06:04

## 2021-09-18 RX ADMIN — PRENATAL WITH FERROUS FUM AND FOLIC ACID 1 TABLET: 3080; 920; 120; 400; 22; 1.84; 3; 20; 10; 1; 12; 200; 27; 25; 2 TABLET ORAL at 08:24

## 2021-09-18 NOTE — PLAN OF CARE
Problem: Adult Inpatient Plan of Care  Goal: Plan of Care Review  Outcome: Met  Flowsheets (Taken 9/18/2021 1134)  Progress: improving  Plan of Care Reviewed With:   patient   spouse  Goal: Patient-Specific Goal (Individualized)  Outcome: Met  Goal: Absence of Hospital-Acquired Illness or Injury  Outcome: Met  Intervention: Identify and Manage Fall Risk  Recent Flowsheet Documentation  Taken 9/18/2021 0830 by Naomi Darby  Safety Promotion/Fall Prevention: safety round/check completed  Intervention: Prevent Skin Injury  Recent Flowsheet Documentation  Taken 9/18/2021 0830 by Naomi Darby  Body Position: position changed independently  Goal: Optimal Comfort and Wellbeing  Outcome: Met  Intervention: Provide Person-Centered Care  Recent Flowsheet Documentation  Taken 9/18/2021 0830 by Naomi Darby  Trust Relationship/Rapport:   care explained   choices provided   emotional support provided   empathic listening provided   questions answered   questions encouraged   reassurance provided   thoughts/feelings acknowledged  Goal: Readiness for Transition of Care  Outcome: Met     Problem: Adjustment to Role Transition (Postpartum Vaginal Delivery)  Goal: Successful Maternal Role Transition  Outcome: Met     Problem: Bleeding (Postpartum Vaginal Delivery)  Goal: Hemostasis  Outcome: Met     Problem: Infection (Postpartum Vaginal Delivery)  Goal: Absence of Infection Signs and Symptoms  Outcome: Met     Problem: Pain (Postpartum Vaginal Delivery)  Goal: Acceptable Pain Control  Outcome: Met     Problem: Urinary Retention (Postpartum Vaginal Delivery)  Goal: Effective Urinary Elimination  Outcome: Met     Problem: Breastfeeding  Goal: Effective Breastfeeding  Outcome: Met   Goal Outcome Evaluation:  Plan of Care Reviewed With: patient, spouse        Progress: improving

## 2021-09-18 NOTE — PROGRESS NOTES
Encounter Provider:  No name on file.  Place of Service:  [facility]  Patient Name: Solange Guzmán  :  1995    Subjective   Patient ID: Solange Guzmán is a 26 y.o. female    Postpartum day 2 after vaginal delivery, second-degree laceration  Breast-feeding,  No complaints, desires discharge home    Objective        Normotensive  Abdomen soft, fundus nontender  Extremities trace edema    Assessment/Plan     Postpartum day 2 vaginal delivery  Stable, desires discharge  Home with Motrin  Follow-up 5 weeks             Tito Elias Sr., MD  2021  09:48 EDT

## 2021-09-18 NOTE — PLAN OF CARE
Problem: Adult Inpatient Plan of Care  Goal: Absence of Hospital-Acquired Illness or Injury  Outcome: Ongoing, Progressing  Intervention: Identify and Manage Fall Risk  Recent Flowsheet Documentation  Taken 9/17/2021 2200 by Nayeli Velasquez RN  Safety Promotion/Fall Prevention: safety round/check completed   Goal Outcome Evaluation:

## 2021-09-18 NOTE — LACTATION NOTE
Mom states feeding is going good, she is doing some supplementation. Nipples are tender with initial latch but get better with feeding. D/C instructions gone over, included hand hygiene, respiratory hygiene and breastfeeding when mom is sick, LC encouraged mom to see pediatrician two days from discharge for follow up. LC discussed  breastfeeding behaviors, first two weeks of breastfeeding expectations, encouraged her to breastfeed for good milk supply, early lactogenesis II and anticipatory guidance. LC discussed nipple care, plugged ducts, engorgement, and breast infection. LAZARUS informed mom that LC was available after D/C for assistance with breastfeeding

## 2021-10-26 ENCOUNTER — POSTPARTUM VISIT (OUTPATIENT)
Dept: OBSTETRICS AND GYNECOLOGY | Facility: CLINIC | Age: 26
End: 2021-10-26

## 2021-10-26 VITALS
HEART RATE: 63 BPM | SYSTOLIC BLOOD PRESSURE: 94 MMHG | DIASTOLIC BLOOD PRESSURE: 61 MMHG | WEIGHT: 247 LBS | BODY MASS INDEX: 39.89 KG/M2

## 2021-10-26 DIAGNOSIS — E66.9 OBESITY (BMI 30-39.9): Chronic | ICD-10-CM

## 2021-10-26 PROBLEM — O99.210 OBESITY IN PREGNANCY, ANTEPARTUM: Status: RESOLVED | Noted: 2021-09-14 | Resolved: 2021-10-26

## 2021-10-26 PROBLEM — O36.63X0 MACROSOMIA AFFECTING MANAGEMENT OF MOTHER IN THIRD TRIMESTER: Status: RESOLVED | Noted: 2021-09-14 | Resolved: 2021-10-26

## 2021-10-26 PROBLEM — Z34.90 ENCOUNTER FOR INDUCTION OF LABOR: Status: RESOLVED | Noted: 2021-09-16 | Resolved: 2021-10-26

## 2021-10-26 PROBLEM — O99.820 GBS (GROUP B STREPTOCOCCUS CARRIER), +RV CULTURE, CURRENTLY PREGNANT: Status: RESOLVED | Noted: 2021-09-16 | Resolved: 2021-10-26

## 2021-10-26 LAB
GLUCOSE UR STRIP-MCNC: NEGATIVE MG/DL
PROT UR STRIP-MCNC: NEGATIVE MG/DL

## 2021-10-26 PROCEDURE — 0503F POSTPARTUM CARE VISIT: CPT | Performed by: OBSTETRICS & GYNECOLOGY

## 2021-10-26 NOTE — ASSESSMENT & PLAN NOTE
Stable postpartum course.  Breast-feeding support provided  Maintain pelvic rest for 6 weeks otherwise no other restrictions.  Patient declines birth control

## 2021-10-26 NOTE — PROGRESS NOTES
POSTPARTUM Follow Up Visit    CC:  Postpartum     HPI:      Antepartum or Postpartum complications: Elevated BMI- pre-pregnancy  Delivery type:    Perineum: 2nd degree laceration, And periurethral  Feeding: Breast     Pain:  No  Vaginal Bleeding:  Yes  EPDS score: 1  Plans for BC:  None  Last PAP:   Last Completed Pap Smear          PAP SMEAR (Every 3 Years) Next due on 12/3/2023    2020  SCANNED - PAP SMEAR                BP 94/61   Pulse 63   Wt 112 kg (247 lb)   LMP  (LMP Unknown)   Breastfeeding Yes   BMI 39.89 kg/m²     Physical Exam      ASSESSMENT AND PLAN:  Diagnoses and all orders for this visit:    1. Postpartum follow-up (Primary)  -     POC Urinalysis Dipstick    2. Obesity (BMI 30-39.9)  Assessment & Plan:  Discussed nutrition, exercise and recommended weight loss.      3.  (normal spontaneous vaginal delivery)  Assessment & Plan:  Stable postpartum course      4. Second degree perineal laceration during delivery  Assessment & Plan:  Healing well.  Some suture still present.  Maintain pelvic rest for at least 6 weeks.      5. Encounter for postpartum visit  Assessment & Plan:  Stable postpartum course.  Breast-feeding support provided  Maintain pelvic rest for 6 weeks otherwise no other restrictions.  Patient declines birth control        Counseling:  All birth control options reviewed in detail.  R/B/A/SE/E of each wrt pts PMHx and prior BC use.  May resume normal activities  Core strengthening exercises reviewed and recommended  Kegel exercises reviewed and recommended  Ok to return to work/school  Abstinence for 6 weeks postpartum weeks      Follow Up:  Return for Annual physical.    Dylan Deleon MD  10/26/2021

## 2021-11-24 ENCOUNTER — OFFICE VISIT (OUTPATIENT)
Dept: OBSTETRICS AND GYNECOLOGY | Facility: CLINIC | Age: 26
End: 2021-11-24

## 2021-11-24 VITALS — BODY MASS INDEX: 40.65 KG/M2 | WEIGHT: 244 LBS | HEIGHT: 65 IN

## 2021-11-24 DIAGNOSIS — Z53.21 PATIENT LEFT WITHOUT BEING SEEN: Primary | ICD-10-CM

## 2021-12-14 ENCOUNTER — OFFICE VISIT (OUTPATIENT)
Dept: OBSTETRICS AND GYNECOLOGY | Facility: CLINIC | Age: 26
End: 2021-12-14

## 2021-12-14 VITALS
DIASTOLIC BLOOD PRESSURE: 81 MMHG | HEART RATE: 69 BPM | HEIGHT: 65 IN | WEIGHT: 242 LBS | BODY MASS INDEX: 40.32 KG/M2 | SYSTOLIC BLOOD PRESSURE: 116 MMHG

## 2021-12-14 DIAGNOSIS — Z01.419 WELL WOMAN EXAM: Primary | ICD-10-CM

## 2021-12-14 DIAGNOSIS — E66.9 OBESITY (BMI 30-39.9): Chronic | ICD-10-CM

## 2021-12-14 PROCEDURE — 99395 PREV VISIT EST AGE 18-39: CPT | Performed by: OBSTETRICS & GYNECOLOGY

## 2021-12-14 PROCEDURE — G0123 SCREEN CERV/VAG THIN LAYER: HCPCS | Performed by: OBSTETRICS & GYNECOLOGY

## 2021-12-14 NOTE — PROGRESS NOTES
"Well Woman Visit    CC: RITA     HPI:   26 y.o. who presents for a well woman exam. She is currently nursing. She denies any menstrual bleeding while nursing. She denies any pelvic pain or other problems.  She is using condoms for birth control.    History: PMHx, Meds, Allergies, PSHx, Social Hx, and POBHx all reviewed and updated.      /81   Pulse 69   Ht 165.1 cm (65\")   Wt 110 kg (242 lb)   BMI 40.27 kg/m²     Physical Exam  Vitals and nursing note reviewed. Exam conducted with a chaperone present.   Constitutional:       General: She is not in acute distress.     Appearance: Normal appearance. She is obese. She is not ill-appearing.   HENT:      Head: Normocephalic and atraumatic.      Mouth/Throat:      Mouth: Mucous membranes are moist.   Eyes:      Extraocular Movements: Extraocular movements intact.   Neck:      Thyroid: No thyroid mass or thyromegaly.   Cardiovascular:      Rate and Rhythm: Normal rate and regular rhythm.      Heart sounds: No murmur heard.      Pulmonary:      Effort: Pulmonary effort is normal.      Breath sounds: No wheezing.   Chest:   Breasts: Breasts are symmetrical.      Right: Nipple discharge present. No swelling, bleeding, inverted nipple, mass, skin change, tenderness or axillary adenopathy.      Left: Nipple discharge present. No swelling, bleeding, inverted nipple, mass, skin change, tenderness, axillary adenopathy or supraclavicular adenopathy.        Comments: Patient is currently lactating and engorgement is present  Abdominal:      General: Abdomen is flat. There is no distension.      Palpations: Abdomen is soft. There is no mass.      Tenderness: There is no abdominal tenderness. There is no guarding or rebound.      Hernia: No hernia is present. There is no hernia in the left inguinal area or right inguinal area.   Genitourinary:     General: Normal vulva.      Exam position: Lithotomy position.      Pubic Area: No rash.       Labia:         Right: No " rash, tenderness, lesion or injury.         Left: No rash, tenderness, lesion or injury.       Urethra: No prolapse, urethral pain, urethral swelling or urethral lesion.      Vagina: Normal. No signs of injury and foreign body. No vaginal discharge, erythema, tenderness, bleeding or prolapsed vaginal walls.      Cervix: Normal.      Uterus: Normal. Not deviated, not enlarged, not fixed, not tender and no uterine prolapse.       Adnexa: Right adnexa normal and left adnexa normal.        Right: No mass, tenderness or fullness.          Left: No mass, tenderness or fullness.     Musculoskeletal:         General: No swelling.      Right lower leg: No edema.      Left lower leg: No edema.   Lymphadenopathy:      Upper Body:      Right upper body: No axillary adenopathy.      Left upper body: No supraclavicular or axillary adenopathy.      Lower Body: No right inguinal adenopathy. No left inguinal adenopathy.   Skin:     General: Skin is warm and dry.   Neurological:      Mental Status: She is alert and oriented to person, place, and time.   Psychiatric:         Mood and Affect: Mood normal.         Behavior: Behavior normal.         Thought Content: Thought content normal.         ASSESSMENT AND PLAN:      Diagnoses and all orders for this visit:    1. Well woman exam (Primary)  Assessment & Plan:  Declines hormonal contraception    Orders:  -     IGP,rfx Aptima HPV All Pth    2. Obesity (BMI 30-39.9)  Assessment & Plan:  Discussed nutrition, exercise and recommended weight loss        Counseling:     All BC Options R/B/A/SE/E of each reviewed    Preventative:   Recommend FLU vaccine this season, R/B discussed  Recommend COVID vaccine, R/B discussed    She understands the importance of having any ordered tests to be performed in a timely fashion.  The risks of not performing them include, but are not limited to, advanced cancer stages, bone loss from osteoporosis and/or subsequent increase in morbidity and/or mortality.   She is encouraged to review her results online and/or contact or office if she has questions.     Follow Up:  Return for Annual physical.    Dylan Deleon MD  12/14/2021

## 2021-12-21 LAB
CONV .: NORMAL
CYTOLOGIST CVX/VAG CYTO: NORMAL
CYTOLOGY CVX/VAG DOC CYTO: NORMAL
CYTOLOGY CVX/VAG DOC THIN PREP: NORMAL
DX ICD CODE: NORMAL
HIV 1 & 2 AB SER-IMP: NORMAL
Lab: NORMAL
OTHER STN SPEC: NORMAL
STAT OF ADQ CVX/VAG CYTO-IMP: NORMAL

## 2022-10-20 NOTE — PROGRESS NOTES
The patient left the office before care was provided and did not complete the visit the visit was canceled.

## 2023-01-05 PROCEDURE — G0463 HOSPITAL OUTPT CLINIC VISIT: HCPCS

## 2023-04-16 NOTE — H&P
ANUJA Lawson  Obstetric History and Physical    Chief Complaint:  Scheduled IOL    Subjective:  The patient is a 26 y.o.  currently at 39w1d, who presents for induction of labor secondary to suspected fetal macrosomia.  She has no complaints today.    Her prenatal care is complicated by: Elevated BMI and LGA    The following portions of the patients history were reviewed and updated as appropriate: current medications, allergies, past medical history, past surgical history, past family history, past social history and problem list .     Prenatal Information:  Prenatal Results     POC Urine Glucose/Protein     Test Value Reference Range Date Time    Urine Glucose  Negative mg/dL Negative, 1000 mg/dL (3+) 21 1014    Urine Protein  Negative mg/dL Negative 21 1014          Initial Prenatal Labs     Test Value Reference Range Date Time    Hemoglobin ^ 13.5 g/dL  21     Hematocrit ^ 41 %  21     Platelets ^ 258 K/µL  21     Rubella IgG ^ immune   21     Hepatitis B SAg ^ Negative   21     Hepatitis C Ab ^ net   21     RPR ^ Negative   21     ABO ^ A   21     Rh ^ Positive   21     Antibody Screen ^ Normal  Normal 21     HIV        Urine Culture        Gonorrhea ^ neg   21     Chlamydia ^ neg   21     TSH              2nd and 3rd Trimester     Test Value Reference Range Date Time    Hemoglobin (repeated)        Hematocrit (repeated)        GCT        Antibody Screen (repeated)        GTT Fasting        GTT 1 Hr        GTT 2 Hr        GTT 3 Hr        Group B Strep              Drug Screening     Test Value Reference Range Date Time    Amphetamine Screen        Barbiturate Screen        Benzodiazepine Screen        Methadone Screen        Phencyclidine Screen        Opiates Screen        THC Screen        Cocaine Screen        Propoxyphene Screen        Buprenorphine Screen        Methamphetamine Screen        Oxycodone Screen         Tricyclic Antidepressants Screen              Other (Risk screening)     Test Value Reference Range Date Time    Varicella IgG        Parvovirus IgG        CMV IgG        Cystic Fibrosis        Hemoglobin electrophoresis        NIPT        MSAFP-4        AFP (for NTD only)              Legend    ^: Historical                      External Prenatal Results     Pregnancy Outside Results - Transcribed From Office Records - See Scanned Records For Details     Test Value Date Time    ABO ^ A  21     Rh ^ Positive  21     Antibody Screen ^ Normal  21     Varicella IgG       Rubella ^ immune  21     Hgb ^ 13.5 g/dL 21     Hct ^ 41 % 21     Glucose Fasting GTT       Glucose Tolerance Test 1 hour       Glucose Tolerance Test 3 hour       Gonorrhea (discrete) ^ neg  21     Chlamydia (discrete) ^ neg  21     RPR ^ Negative  21     VDRL       Syphilis Antibody       HBsAg ^ Negative  21     Herpes Simplex Virus PCR       Herpes Simplex VIrus Culture       HIV       Hep C RNA Quant PCR       Hep C Antibody ^ net  21     AFP       Group B Strep       GBS Susceptibility to Clindamycin       GBS Susceptibility to Erythromycin       Fetal Fibronectin       Genetic Testing, Maternal Blood             Drug Screening     Test Value Date Time    Urine Drug Screen       Amphetamine Screen       Barbiturate Screen       Benzodiazepine Screen       Methadone Screen       Phencyclidine Screen       Opiates Screen       THC Screen       Cocaine Screen       Propoxyphene Screen       Buprenorphine Screen       Methamphetamine Screen       Oxycodone Screen       Tricyclic Antidepressants Screen             Legend    ^: Historical                        Past OB History:  OB History    Para Term  AB Living   2 1 1 0 0 1   SAB TAB Ectopic Molar Multiple Live Births   0 0 0 0 0 1      # Outcome Date GA Lbr Jt/2nd Weight Sex Delivery Anes PTL Lv   2 Current             1 Term 09/27/20 37w0d       HADLEY       Past Medical History:  History reviewed. No pertinent past medical history.    Past Surgical History:  Past Surgical History:   Procedure Laterality Date   • ULNAR NERVE REPAIR  02/12/2016   • ULNAR NERVE TRANSPOSITION Right 12/14/2015   • WISDOM TOOTH EXTRACTION  01/2015       Family History:  Family History   Problem Relation Age of Onset   • No Known Problems Mother    • No Known Problems Father        Social History:   reports that she has never smoked. She has never used smokeless tobacco.   reports no history of alcohol use.   reports no history of drug use.    Medications:  Prenatal vitamin    Allergies:  No Known Allergies    Review of Systems:  No leaking fluid, No vaginal bleeding, Adequate FM, No HA, No scotomata or vision changes, No RUQ/epigastric pain, No fever/chills, No nausea, No vomiting, No diarrhea, No constipation, No abdominal pain, No back pain, No UTI symptoms, Contractions and Swelling    Objective     Vital Signs:        Physical Exam:    General:  alert, well appearing, in no apparent distress  HEENT: PERRLA, extra ocular movement intact, sclera clear, anicteric and neck supple with midline trachea  Cardiovascular: normal rate, regular rhythm,  no murmurs, rubs, or gallops  Lungs: clear to auscultation, no wheezes, rales or rhonchi, symmetric air entry  Abdomen: soft, nontender, nondistended, no abnormal masses, no epigastric pain, fundus soft, nontender 41 weeks size and estimated fetal weight: 8.5-9 pounds by Leopold's maneuvers.  Ultrasound on 8/26/2021 predicted 7 pounds 11 ounces  Extremities: 1+ edema, no redness or tenderness in the calves or thighs 2+ bilaterally  Pelvic exam: Presentation: cephalic  Dilation: 1cm  Effacement: 50%  Station: -3    Fetal Assessment:    FHR:   Baseline: 135  Variability: Moderate  Accelerations: Present (32 weeks+) 15 x 15 bpm  Decelerations: Absent   Category: Category 1    Contractions: Irregular    Fetal  Presentation: cephalic    Labs:   Lab Results (last 24 hours)     ** No results found for the last 24 hours. **           Hospital Problems:    Macrosomia affecting management of mother in third trimester      Assessment:  26 y.o.  currently at 39w1d    Macrosomia affecting management of mother in third trimester  Obesity complicating pregnancy  Plan induction of labor  GBS: Positive    Plan:  IOL, pitocin and cervical catheter  The risks, benefits, and alternatives of induction of labor have been discussed the patient, including the risk of failed induction of labor, an increased risk of  delivery, and increased risk of fetal heart rate problems during the induction that may lead to emergent  delivery.  We have discussed the risks of medication use for induction and augmentation of labor including prostaglandins, such as Cytotec and other agents such as oxytocin.  We have discussed that drugs such as this have warnings for use in pregnancy, however, have long established safety and efficacy for induction of labor or augmentation of labor when used appropriately.  We have discussed the use of a cervical ripening balloon for induction of labor and/or cervical ripening.  We have discussed the risks, benefits, and alternatives to this method of induction of labor we've also discussed that [employer] of obstetrics [employer] gyne[employer] endorses the use of drugs such as these for induction of labor and augmentation of labor.  We have also specifically discussed the indication of her induction being size greater than dates and elevated BMI.  We have discussed the risks of possible shoulder dystocia which could lead to permanent fetal and/or maternal injury and/or death.  We discussed specific risks including brachial plexus injury, anoxic brain injury resulting in cerebral palsy or death, severe perineal laceration fistula formation etc.  The patient expressed her understanding of these risks and  wishes to proceed.  Plan of care has been reviewed with patient  Risks, benefits of treatment plan have been discussed.  All questions have been answered.    Dylan Deleon MD  9/16/2021  08:20 EDT     No

## 2023-05-31 ENCOUNTER — INITIAL PRENATAL (OUTPATIENT)
Dept: OBSTETRICS AND GYNECOLOGY | Facility: CLINIC | Age: 28
End: 2023-05-31

## 2023-05-31 VITALS — BODY MASS INDEX: 40.77 KG/M2 | SYSTOLIC BLOOD PRESSURE: 108 MMHG | DIASTOLIC BLOOD PRESSURE: 74 MMHG | WEIGHT: 245 LBS

## 2023-05-31 DIAGNOSIS — O99.210 OBESITY AFFECTING PREGNANCY, ANTEPARTUM: ICD-10-CM

## 2023-05-31 DIAGNOSIS — Z34.80 SUPERVISION OF OTHER NORMAL PREGNANCY, ANTEPARTUM: Primary | ICD-10-CM

## 2023-05-31 PROBLEM — Z01.419 WELL WOMAN EXAM: Status: RESOLVED | Noted: 2021-12-14 | Resolved: 2023-05-31

## 2023-05-31 LAB
ABO GROUP BLD: NORMAL
B-HCG UR QL: POSITIVE
BASOPHILS # BLD AUTO: 0.03 10*3/MM3 (ref 0–0.2)
BASOPHILS NFR BLD AUTO: 0.6 % (ref 0–1.5)
BLD GP AB SCN SERPL QL: NEGATIVE
DEPRECATED RDW RBC AUTO: 40.1 FL (ref 37–54)
EOSINOPHIL # BLD AUTO: 0.08 10*3/MM3 (ref 0–0.4)
EOSINOPHIL NFR BLD AUTO: 1.6 % (ref 0.3–6.2)
ERYTHROCYTE [DISTWIDTH] IN BLOOD BY AUTOMATED COUNT: 13.1 % (ref 12.3–15.4)
EXPIRATION DATE: ABNORMAL
GLUCOSE UR STRIP-MCNC: NEGATIVE MG/DL
HBV SURFACE AG SERPL QL IA: NORMAL
HCT VFR BLD AUTO: 39.1 % (ref 34–46.6)
HCV AB SER DONR QL: NORMAL
HGB BLD-MCNC: 13.5 G/DL (ref 12–15.9)
HIV1+2 AB SER QL: NORMAL
IMM GRANULOCYTES # BLD AUTO: 0.01 10*3/MM3 (ref 0–0.05)
IMM GRANULOCYTES NFR BLD AUTO: 0.2 % (ref 0–0.5)
INTERNAL NEGATIVE CONTROL: ABNORMAL
INTERNAL POSITIVE CONTROL: ABNORMAL
LYMPHOCYTES # BLD AUTO: 1.26 10*3/MM3 (ref 0.7–3.1)
LYMPHOCYTES NFR BLD AUTO: 24.4 % (ref 19.6–45.3)
Lab: ABNORMAL
MCH RBC QN AUTO: 28.9 PG (ref 26.6–33)
MCHC RBC AUTO-ENTMCNC: 34.5 G/DL (ref 31.5–35.7)
MCV RBC AUTO: 83.7 FL (ref 79–97)
MONOCYTES # BLD AUTO: 0.38 10*3/MM3 (ref 0.1–0.9)
MONOCYTES NFR BLD AUTO: 7.4 % (ref 5–12)
NEUTROPHILS NFR BLD AUTO: 3.4 10*3/MM3 (ref 1.7–7)
NEUTROPHILS NFR BLD AUTO: 65.8 % (ref 42.7–76)
NRBC BLD AUTO-RTO: 0 /100 WBC (ref 0–0.2)
PLATELET # BLD AUTO: 244 10*3/MM3 (ref 140–450)
PMV BLD AUTO: 9.9 FL (ref 6–12)
PROT UR STRIP-MCNC: NEGATIVE MG/DL
RBC # BLD AUTO: 4.67 10*6/MM3 (ref 3.77–5.28)
RH BLD: POSITIVE
T PALLIDUM IGG SER QL: NORMAL
WBC NRBC COR # BLD: 5.16 10*3/MM3 (ref 3.4–10.8)

## 2023-05-31 PROCEDURE — 87086 URINE CULTURE/COLONY COUNT: CPT | Performed by: OBSTETRICS & GYNECOLOGY

## 2023-05-31 PROCEDURE — 86901 BLOOD TYPING SEROLOGIC RH(D): CPT | Performed by: OBSTETRICS & GYNECOLOGY

## 2023-05-31 PROCEDURE — 86900 BLOOD TYPING SEROLOGIC ABO: CPT | Performed by: OBSTETRICS & GYNECOLOGY

## 2023-05-31 PROCEDURE — G0432 EIA HIV-1/HIV-2 SCREEN: HCPCS | Performed by: OBSTETRICS & GYNECOLOGY

## 2023-05-31 PROCEDURE — 86803 HEPATITIS C AB TEST: CPT | Performed by: OBSTETRICS & GYNECOLOGY

## 2023-05-31 PROCEDURE — 86780 TREPONEMA PALLIDUM: CPT | Performed by: OBSTETRICS & GYNECOLOGY

## 2023-05-31 PROCEDURE — G0123 SCREEN CERV/VAG THIN LAYER: HCPCS | Performed by: OBSTETRICS & GYNECOLOGY

## 2023-05-31 PROCEDURE — 87491 CHLMYD TRACH DNA AMP PROBE: CPT | Performed by: OBSTETRICS & GYNECOLOGY

## 2023-05-31 PROCEDURE — 87340 HEPATITIS B SURFACE AG IA: CPT | Performed by: OBSTETRICS & GYNECOLOGY

## 2023-05-31 PROCEDURE — 87661 TRICHOMONAS VAGINALIS AMPLIF: CPT | Performed by: OBSTETRICS & GYNECOLOGY

## 2023-05-31 PROCEDURE — 87591 N.GONORRHOEAE DNA AMP PROB: CPT | Performed by: OBSTETRICS & GYNECOLOGY

## 2023-05-31 PROCEDURE — 86850 RBC ANTIBODY SCREEN: CPT | Performed by: OBSTETRICS & GYNECOLOGY

## 2023-05-31 PROCEDURE — 85025 COMPLETE CBC W/AUTO DIFF WBC: CPT | Performed by: OBSTETRICS & GYNECOLOGY

## 2023-05-31 PROCEDURE — 86762 RUBELLA ANTIBODY: CPT | Performed by: OBSTETRICS & GYNECOLOGY

## 2023-05-31 NOTE — PROGRESS NOTES
OB Initial Visit    CC- Here for care of current pregnancy     Subjective:  27 y.o.  presenting for her first obstetrical visit.    LMP: Patient's last menstrual period was 2023.   The patient reports that her periods been very irregular over the last few months so she cannot be completely reliable on this most recent last period.  She reports some mild nausea.  She denies any pelvic pain or vaginal bleeding.      Reviewed and updated:  OBHx, GYNHx (STDs), PMHx, Medications, Allergies, PSHx, Social Hx, Preventative Hx (PAP), Hx of abuse/safe environment, Vaccine Hx including hx of chickenpox or vaccine, Genetic Hx (pt, FOB, both families).        Objective:  /74   Wt 111 kg (245 lb)   LMP 2023   BMI 40.77 kg/m²   General- NAD, alert and oriented, appropriate  Psych- Normal mood, good memory  Neck- No masses, no thyroid enlargement  CV- Regular rhythm, no murnurs  Resp- CTA to bases, no wheezes  Abdomen- Soft, non distended, non tender, no masses   External genitalia- Normal, no lesions  Urethra- Normal, no masses, non tender  Vagina- Normal, no discharge  Bladder- Normal, no masses, non tender  Cvx- Normal, no lesions, no discharge, no CMT  Uterus- Normal shape and consistency, non tender  Adnexa- Normal, no mass, non tender  Lymphatic- No palpable neck, axillary, or groin nodes  Ext- No edema, no cyanosis    Skin- No lesions, no rashes, no acanthosis nigricans    Bedside transvaginal ultrasound: Within the uterine cavity there is a gestational sac which is regularly shaped.  Within the gestational sac is a small yolk sac.  There is not a definitive fetal pole but a possible early fetal pole.  No cardiac activity is noted.  No free pelvic fluid or adnexal masses are noted.  Findings are consistent with an early intrauterine pregnancy.    Assessment and Plan:  Diagnoses and all orders for this visit:    1. Supervision of other normal pregnancy, antepartum  (Primary)  Overview:  EDC:    Prenatal genetic screening: Declined    Obesity    COVID-19 vaccine: Recommended  Tdap vaccine:  Flu vaccine: Recommended    Assessment & Plan:  Continue prenatal vitamins  Check prenatal labs  Check dating ultrasound in approximately 2 weeks to evaluate for dates and fetal viability.  I suspect based on the ultrasound findings today the patient is several weeks earlier than she anticipated by her last period.  This is not surprising given that she reports her cycles have been somewhat irregular over the last few months.  Discussed office visit schedule and call rotation  Reviewed medication safe in pregnancy  Declines prenatal genetic screening  Reviewed nutrition, exercise, and appropriate weight gain in pregnancy    Orders:  -     POC Urinalysis Dipstick  -     POC Pregnancy, Urine  -     Urine Culture - Urine, Urine, Clean Catch  -     IGP,CtNgTv,rfx Aptima HPV ASCU  -     OB Panel With HIV  -     US Ob < 14 Weeks Single or First Gestation; Future    2. Obesity affecting pregnancy, antepartum  Assessment & Plan:  Discussed exercise, nutrition and appropriate weight gain during pregnancy.            10w0d     Genetic Screening: Counseled.  Declines all.     Vaccines: Recommend FLU vaccine this season, R/B discussed  Recommend COVID vaccine, R/B discussed    Counseling: Nutrition discussed, calories, activity/exercise in pregnancy  Discussed dietary restrictions/safety food preparation in pregnancy  Reviewed what to expect prenatal visits, office providers and [facility] hospitalists  Appropriate trimester precautions provided, N/V, vag bleeding, cramping  Questions answered    Return in about 4 weeks (around 6/28/2023) for Recheck.      Dylan Deleon MD  05/31/2023

## 2023-06-01 PROBLEM — O99.210 OBESITY AFFECTING PREGNANCY, ANTEPARTUM: Status: ACTIVE | Noted: 2023-06-01

## 2023-06-01 LAB
BACTERIA SPEC AEROBE CULT: NORMAL
RUBV IGG SERPL IA-ACNC: 5.47 INDEX

## 2023-06-01 NOTE — ASSESSMENT & PLAN NOTE
Continue prenatal vitamins  Check prenatal labs  Check dating ultrasound in approximately 2 weeks to evaluate for dates and fetal viability.  I suspect based on the ultrasound findings today the patient is several weeks earlier than she anticipated by her last period.  This is not surprising given that she reports her cycles have been somewhat irregular over the last few months.  Discussed office visit schedule and call rotation  Reviewed medication safe in pregnancy  Declines prenatal genetic screening  Reviewed nutrition, exercise, and appropriate weight gain in pregnancy

## 2023-06-04 LAB
C TRACH RRNA CVX QL NAA+PROBE: NEGATIVE
CONV .: NORMAL
CYTOLOGIST CVX/VAG CYTO: NORMAL
CYTOLOGY CVX/VAG DOC CYTO: NORMAL
CYTOLOGY CVX/VAG DOC THIN PREP: NORMAL
DX ICD CODE: NORMAL
HIV 1 & 2 AB SER-IMP: NORMAL
N GONORRHOEA RRNA CVX QL NAA+PROBE: NEGATIVE
OTHER STN SPEC: NORMAL
STAT OF ADQ CVX/VAG CYTO-IMP: NORMAL
T VAGINALIS RRNA SPEC QL NAA+PROBE: NEGATIVE

## 2023-06-28 PROBLEM — O09.293 HISTORY OF MACROSOMIA IN INFANT IN PRIOR PREGNANCY, CURRENTLY PREGNANT IN THIRD TRIMESTER: Status: ACTIVE | Noted: 2023-06-28

## 2023-06-28 PROBLEM — O09.293 HISTORY OF MACROSOMIA IN INFANT IN PRIOR PREGNANCY, CURRENTLY PREGNANT IN THIRD TRIMESTER: Status: RESOLVED | Noted: 2023-06-28 | Resolved: 2023-06-28

## 2023-07-26 ENCOUNTER — ROUTINE PRENATAL (OUTPATIENT)
Dept: OBSTETRICS AND GYNECOLOGY | Facility: CLINIC | Age: 28
End: 2023-07-26
Payer: COMMERCIAL

## 2023-07-26 VITALS — BODY MASS INDEX: 40.94 KG/M2 | DIASTOLIC BLOOD PRESSURE: 80 MMHG | WEIGHT: 246 LBS | SYSTOLIC BLOOD PRESSURE: 125 MMHG

## 2023-07-26 DIAGNOSIS — O99.210 OBESITY AFFECTING PREGNANCY, ANTEPARTUM: ICD-10-CM

## 2023-07-26 DIAGNOSIS — Z34.80 SUPERVISION OF OTHER NORMAL PREGNANCY, ANTEPARTUM: Primary | ICD-10-CM

## 2023-07-26 DIAGNOSIS — O09.293 HISTORY OF MACROSOMIA IN INFANT IN PRIOR PREGNANCY, CURRENTLY PREGNANT IN THIRD TRIMESTER: ICD-10-CM

## 2023-07-26 LAB
GLUCOSE UR STRIP-MCNC: NEGATIVE MG/DL
PROT UR STRIP-MCNC: NEGATIVE MG/DL

## 2023-07-26 NOTE — PROGRESS NOTES
OB FOLLOW UP    CC: Scheduled OB routine FU    Prenatal care complicated by:   Patient Active Problem List   Diagnosis    Obesity (BMI 30-39.9)    Supervision of other normal pregnancy, antepartum    Obesity affecting pregnancy, antepartum    History of macrosomia in infant in prior pregnancy, currently pregnant in third trimester       Subjective:   Patient has: No complaints, No leaking fluid, No vaginal bleeding, No contractions  No fetal movement yet    Objective:  Urine glucose/protein- see flow sheet      /80   Wt 112 kg (246 lb)   LMP 03/22/2023   BMI 40.94 kg/m²   See OB flow for LE edema, and cvx exam if applicable  FHT: 159 BPM   Uterine Size: size equals dates      Assessment and Plan:  Diagnoses and all orders for this visit:    1. Supervision of other normal pregnancy, antepartum (Primary)  Overview:  EDC: 1/26/2024    Prenatal genetic screening: Declined    Obesity    COVID-19 vaccine: Recommended  Tdap vaccine:  Flu vaccine: Recommended    Assessment & Plan:  Continue prenatal vitamin  Schedule anatomy ultrasound    Orders:  -     POC Urinalysis Dipstick  -     US Ob Detail Fetal Anatomy Single or First Gestation; Future    2. History of macrosomia in infant in prior pregnancy, currently pregnant in third trimester  Overview:  Third trimester ultrasound for growth      3. Obesity affecting pregnancy, antepartum  Assessment & Plan:  Discussed exercise, nutrition and appropriate weight gain in pregnancy            13w5d  Reassuring pregnancy progress    Counseling: Second trimester precautions  OB precautions, leaking, VB, beth rai vs PTL/Labor    Questions answered    Return in about 4 weeks (around 8/23/2023) for Recheck.      Dylan Deleon MD  07/26/2023

## 2023-08-22 NOTE — PROGRESS NOTES
Routine Prenatal Visit     Subjective  Solange Guzmán is a 28 y.o.  at 17w6d here for her routine OB visit.   She is taking her prenatal vitamins.Reports no loss of fluid or vaginal bleeding. Patient doing well without any complaints. Pregnancy is complicated by:     Patient Active Problem List   Diagnosis    Obesity (BMI 30-39.9)    Supervision of other normal pregnancy, antepartum    Obesity affecting pregnancy, antepartum    History of macrosomia in infant in prior pregnancy, currently pregnant in third trimester         OB History    Para Term  AB Living   3 2 2     2   SAB IAB Ectopic Molar Multiple Live Births           0 2      # Outcome Date GA Lbr Jt/2nd Weight Sex Delivery Anes PTL Lv   3 Current            2 Term 21 39w1d 06:47 / 00:57 4190 g (9 lb 3.8 oz) F Vag-Spont EPI N HADLEY   1 Term 20 37w0d  3600 g (7 lb 15 oz) F Vag-Spont  N HADLEY           ROS:   General ROS: negative for - chills or fatigue  Respiratory ROS: negative for - cough or hemoptysis  Cardiovascular ROS: negative for - chest pain or dyspnea on exertion  Genito-Urinary ROS: negative for  change in urinary stream, vaginal discharge   Musculoskeletal ROS: negative for - gait disturbance or joint pain  Dermatological ROS: negative for acne,  dry skin or itching    Objective  Physical Exam:   Vitals:    23 1057   BP: 112/75       FHT: 110-160 BPM    General appearance - alert, well appearing, and in no distress  Mental status - alert, oriented to person, place, and time  Abdomen- Soft, Gravid uterus, non-tender to palpation  Musculoskeletal: negative for - gait disturbance or joint pain  Extremeties: negative swelling or cyanosis   Dermatological: negative rashes or skin lesions     Assessment/Plan:   Diagnoses and all orders for this visit:    1. Supervision of other normal pregnancy, antepartum (Primary)  Assessment & Plan:  KEEGAN finalize:Estimated Date of Delivery: 24 based on US at  7w6d      Genetic testing (NIPS-Quad)/CF/AFP:  Declined     COVID: Recommended   Flu: Recommended   Tdap:Script 27-36 weeks     Rhogam: A Positive/    Sterilization:?    Anatomy US:Ordered   FU US:    EPDS:     PROBLEM LIST/PLAN:   Obesity-BMI >40,  testing 34 weeks, no early GTT done     Orders:  -     POC Urinalysis Dipstick    2. History of macrosomia in infant in prior pregnancy, currently pregnant in third trimester    3. Obesity (BMI 30-39.9)          Counseling:   Second trimester precautions  Round Ligament Pain:  The uterus has several ligaments which provide support and keep the uterus in place. As the  uterus grows these ligaments are pulled and stretched which often causes sharp stabbing like pain in the inguinal area.   You may find a pregnancy support band helpful. Changing positions may also help. Yoga is a great way to cope with round ligament and low back pain in pregnancy.    Massage may also help with low back pain   Things to Consider at this Point in your Pregnancy:  Some women experience swelling in their feet during pregnancy. Compression stockings may help  Drink plenty of water and stay active   Make sure you are eating frequent small meals, nuts are a wonderful snack to keep with you            Return in about 4 weeks (around 2023) for Routine OB visit.      We have gone over prenatal care to include the timing and content of visits. I informed her how to contact the office and/or on call person in the event of any problems and encouraged her to do so when she feels it is necessary.  We then spent time answering her questions which she indicated were answered to her satisfaction.    Sonia Lawler DO  2023 11:20 EDT

## 2023-08-22 NOTE — ASSESSMENT & PLAN NOTE
KEEGAN finalize:Estimated Date of Delivery: 24 based on US at 7w6d      Genetic testing (NIPS-Quad)/CF/AFP:  Declined     COVID: Recommended   Flu: Recommended   Tdap:Script 27-36 weeks     Rhogam: A Positive/    Sterilization:?    Anatomy US:Ordered   FU US:    EPDS:     PROBLEM LIST/PLAN:   Obesity-BMI >40,  testing 34 weeks, no early GTT done

## 2023-08-24 ENCOUNTER — ROUTINE PRENATAL (OUTPATIENT)
Dept: OBSTETRICS AND GYNECOLOGY | Facility: CLINIC | Age: 28
End: 2023-08-24
Payer: COMMERCIAL

## 2023-08-24 VITALS — WEIGHT: 215 LBS | BODY MASS INDEX: 35.78 KG/M2 | SYSTOLIC BLOOD PRESSURE: 112 MMHG | DIASTOLIC BLOOD PRESSURE: 75 MMHG

## 2023-08-24 DIAGNOSIS — E66.9 OBESITY (BMI 30-39.9): Chronic | ICD-10-CM

## 2023-08-24 DIAGNOSIS — O09.293 HISTORY OF MACROSOMIA IN INFANT IN PRIOR PREGNANCY, CURRENTLY PREGNANT IN THIRD TRIMESTER: ICD-10-CM

## 2023-08-24 DIAGNOSIS — Z34.80 SUPERVISION OF OTHER NORMAL PREGNANCY, ANTEPARTUM: Primary | ICD-10-CM

## 2023-08-24 LAB
GLUCOSE UR STRIP-MCNC: NEGATIVE MG/DL
PROT UR STRIP-MCNC: NEGATIVE MG/DL

## 2023-09-19 NOTE — PROGRESS NOTES
OB FOLLOW UP    CC: Scheduled OB routine FU     Prenatal care complicated by:   Patient Active Problem List   Diagnosis    Obesity (BMI 30-39.9)    Supervision of other normal pregnancy, antepartum    Obesity affecting pregnancy, antepartum    History of macrosomia in infant in prior pregnancy, currently pregnant in third trimester       Subjective:   Patient has: No complaints, No leaking fluid, No vaginal bleeding, No contractions, Adequate FM    Objective:  Urine glucose/protein- see flow sheet      /73   Wt 112 kg (248 lb)   LMP 03/22/2023   BMI 41.27 kg/m²   See OB flow for LE edema, and cvx exam if applicable  FHT: 148 BPM   Uterine Size:  22 cm      Assessment and Plan:  Diagnoses and all orders for this visit:    1. Supervision of other normal pregnancy, antepartum (Primary)  Overview:  KEEGAN finalize:Estimated Date of Delivery: 1/26/24    Genetic testing (NIPS-Quad)/CF/AFP:  Declined     COVID: Recommended   Flu: Recommended   Tdap:    Assessment & Plan:  Reviewed today's anatomy ultrasound.  Anatomy ultrasound appears normal.  Appropriate growth noted.  Continue prenatal vitamin  1 hour GTT next office visit    Orders:  -     POC Urinalysis Dipstick    2. Obesity affecting pregnancy, antepartum  Assessment & Plan:  Discussed exercise, nutrition and appropriate weight gain in pregnancy      3. History of macrosomia in infant in prior pregnancy, currently pregnant in third trimester  Overview:  Third trimester ultrasound for growth            21w4d  Reassuring pregnancy progress    Counseling: OB precautions, leaking, VB, beth rai vs PTL/Labor  [facility]    Questions answered    Return in about 4 weeks (around 10/18/2023) for Recheck.      Dylan Deleon MD  09/20/2023

## 2023-09-20 ENCOUNTER — ROUTINE PRENATAL (OUTPATIENT)
Dept: OBSTETRICS AND GYNECOLOGY | Facility: CLINIC | Age: 28
End: 2023-09-20
Payer: COMMERCIAL

## 2023-09-20 VITALS — SYSTOLIC BLOOD PRESSURE: 115 MMHG | BODY MASS INDEX: 41.27 KG/M2 | DIASTOLIC BLOOD PRESSURE: 73 MMHG | WEIGHT: 248 LBS

## 2023-09-20 DIAGNOSIS — O09.293 HISTORY OF MACROSOMIA IN INFANT IN PRIOR PREGNANCY, CURRENTLY PREGNANT IN THIRD TRIMESTER: ICD-10-CM

## 2023-09-20 DIAGNOSIS — O99.210 OBESITY AFFECTING PREGNANCY, ANTEPARTUM: ICD-10-CM

## 2023-09-20 DIAGNOSIS — Z34.80 SUPERVISION OF OTHER NORMAL PREGNANCY, ANTEPARTUM: Primary | ICD-10-CM

## 2023-09-20 LAB
GLUCOSE UR STRIP-MCNC: NEGATIVE MG/DL
PROT UR STRIP-MCNC: NEGATIVE MG/DL

## 2023-09-20 NOTE — ASSESSMENT & PLAN NOTE
Reviewed today's anatomy ultrasound.  Anatomy ultrasound appears normal.  Appropriate growth noted.  Continue prenatal vitamin  1 hour GTT next office visit

## 2023-10-02 ENCOUNTER — HOSPITAL ENCOUNTER (EMERGENCY)
Facility: HOSPITAL | Age: 28
Discharge: HOME | End: 2023-10-03
Attending: EMERGENCY MEDICINE | Admitting: EMERGENCY MEDICINE
Payer: COMMERCIAL

## 2023-10-02 ENCOUNTER — OFFICE VISIT (OUTPATIENT)
Dept: FAMILY MEDICINE CLINIC | Facility: CLINIC | Age: 28
End: 2023-10-02
Payer: COMMERCIAL

## 2023-10-02 ENCOUNTER — TELEPHONE (OUTPATIENT)
Dept: FAMILY MEDICINE CLINIC | Facility: CLINIC | Age: 28
End: 2023-10-02

## 2023-10-02 ENCOUNTER — APPOINTMENT (OUTPATIENT)
Dept: GENERAL RADIOLOGY | Facility: HOSPITAL | Age: 28
End: 2023-10-02
Payer: COMMERCIAL

## 2023-10-02 VITALS
HEART RATE: 85 BPM | BODY MASS INDEX: 41.95 KG/M2 | SYSTOLIC BLOOD PRESSURE: 110 MMHG | DIASTOLIC BLOOD PRESSURE: 86 MMHG | WEIGHT: 251.8 LBS | HEIGHT: 65 IN | OXYGEN SATURATION: 95 %

## 2023-10-02 DIAGNOSIS — Z34.90 PREGNANCY, UNSPECIFIED GESTATIONAL AGE: ICD-10-CM

## 2023-10-02 DIAGNOSIS — Z76.89 ESTABLISHING CARE WITH NEW DOCTOR, ENCOUNTER FOR: Primary | ICD-10-CM

## 2023-10-02 DIAGNOSIS — J40 BRONCHITIS: ICD-10-CM

## 2023-10-02 DIAGNOSIS — J40 BRONCHITIS: Primary | ICD-10-CM

## 2023-10-02 DIAGNOSIS — R05.1 ACUTE COUGH: ICD-10-CM

## 2023-10-02 DIAGNOSIS — E66.01 CLASS 3 SEVERE OBESITY DUE TO EXCESS CALORIES WITHOUT SERIOUS COMORBIDITY WITH BODY MASS INDEX (BMI) OF 40.0 TO 44.9 IN ADULT: ICD-10-CM

## 2023-10-02 PROBLEM — Z34.80 SUPERVISION OF OTHER NORMAL PREGNANCY, ANTEPARTUM: Status: RESOLVED | Noted: 2023-05-31 | Resolved: 2023-10-02

## 2023-10-02 PROBLEM — E66.9 OBESITY WITHOUT SERIOUS COMORBIDITY: Status: ACTIVE | Noted: 2021-10-26

## 2023-10-02 PROBLEM — O99.210 OBESITY AFFECTING PREGNANCY, ANTEPARTUM: Status: RESOLVED | Noted: 2023-06-01 | Resolved: 2023-10-02

## 2023-10-02 PROBLEM — O09.293 HISTORY OF MACROSOMIA IN INFANT IN PRIOR PREGNANCY, CURRENTLY PREGNANT IN THIRD TRIMESTER: Status: RESOLVED | Noted: 2023-06-28 | Resolved: 2023-10-02

## 2023-10-02 LAB
ALBUMIN SERPL-MCNC: 3.6 G/DL (ref 3.5–5.2)
ALBUMIN/GLOB SERPL: 1.3 G/DL
ALP SERPL-CCNC: 77 U/L (ref 39–117)
ALT SERPL W P-5'-P-CCNC: 12 U/L (ref 1–33)
ANION GAP SERPL CALCULATED.3IONS-SCNC: 10.9 MMOL/L (ref 5–15)
AST SERPL-CCNC: 12 U/L (ref 1–32)
BASOPHILS # BLD AUTO: 0.07 10*3/MM3 (ref 0–0.2)
BASOPHILS NFR BLD AUTO: 0.7 % (ref 0–1.5)
BILIRUB SERPL-MCNC: 0.2 MG/DL (ref 0–1.2)
BUN SERPL-MCNC: 8 MG/DL (ref 6–20)
BUN/CREAT SERPL: 13.6 (ref 7–25)
CALCIUM SPEC-SCNC: 9.8 MG/DL (ref 8.6–10.5)
CHLORIDE SERPL-SCNC: 107 MMOL/L (ref 98–107)
CO2 SERPL-SCNC: 21.1 MMOL/L (ref 22–29)
CREAT SERPL-MCNC: 0.59 MG/DL (ref 0.57–1)
DEPRECATED RDW RBC AUTO: 44.8 FL (ref 37–54)
EGFRCR SERPLBLD CKD-EPI 2021: 127.7 ML/MIN/1.73
EOSINOPHIL # BLD AUTO: 0.61 10*3/MM3 (ref 0–0.4)
EOSINOPHIL NFR BLD AUTO: 6.4 % (ref 0.3–6.2)
ERYTHROCYTE [DISTWIDTH] IN BLOOD BY AUTOMATED COUNT: 14.3 % (ref 12.3–15.4)
GLOBULIN UR ELPH-MCNC: 2.8 GM/DL
GLUCOSE SERPL-MCNC: 99 MG/DL (ref 65–99)
HCT VFR BLD AUTO: 33.5 % (ref 34–46.6)
HGB BLD-MCNC: 11.5 G/DL (ref 12–15.9)
IMM GRANULOCYTES # BLD AUTO: 0.03 10*3/MM3 (ref 0–0.05)
IMM GRANULOCYTES NFR BLD AUTO: 0.3 % (ref 0–0.5)
LYMPHOCYTES # BLD AUTO: 1.81 10*3/MM3 (ref 0.7–3.1)
LYMPHOCYTES NFR BLD AUTO: 19 % (ref 19.6–45.3)
MCH RBC QN AUTO: 29.3 PG (ref 26.6–33)
MCHC RBC AUTO-ENTMCNC: 34.3 G/DL (ref 31.5–35.7)
MCV RBC AUTO: 85.2 FL (ref 79–97)
MONOCYTES # BLD AUTO: 0.47 10*3/MM3 (ref 0.1–0.9)
MONOCYTES NFR BLD AUTO: 4.9 % (ref 5–12)
NEUTROPHILS NFR BLD AUTO: 6.54 10*3/MM3 (ref 1.7–7)
NEUTROPHILS NFR BLD AUTO: 68.7 % (ref 42.7–76)
NRBC BLD AUTO-RTO: 0 /100 WBC (ref 0–0.2)
NT-PROBNP SERPL-MCNC: <36 PG/ML (ref 0–450)
PLATELET # BLD AUTO: 226 10*3/MM3 (ref 140–450)
PMV BLD AUTO: 9.7 FL (ref 6–12)
POTASSIUM SERPL-SCNC: 3.5 MMOL/L (ref 3.5–5.2)
PROT SERPL-MCNC: 6.4 G/DL (ref 6–8.5)
RBC # BLD AUTO: 3.93 10*6/MM3 (ref 3.77–5.28)
SODIUM SERPL-SCNC: 139 MMOL/L (ref 136–145)
WBC NRBC COR # BLD: 9.53 10*3/MM3 (ref 3.4–10.8)

## 2023-10-02 PROCEDURE — 71045 X-RAY EXAM CHEST 1 VIEW: CPT

## 2023-10-02 PROCEDURE — 36415 COLL VENOUS BLD VENIPUNCTURE: CPT | Performed by: EMERGENCY MEDICINE

## 2023-10-02 PROCEDURE — 85025 COMPLETE CBC W/AUTO DIFF WBC: CPT | Performed by: EMERGENCY MEDICINE

## 2023-10-02 PROCEDURE — 80053 COMPREHEN METABOLIC PANEL: CPT | Performed by: EMERGENCY MEDICINE

## 2023-10-02 PROCEDURE — 83880 ASSAY OF NATRIURETIC PEPTIDE: CPT | Performed by: EMERGENCY MEDICINE

## 2023-10-02 PROCEDURE — 99284 EMERGENCY DEPT VISIT MOD MDM: CPT

## 2023-10-02 PROCEDURE — 93005 ELECTROCARDIOGRAM TRACING: CPT | Performed by: EMERGENCY MEDICINE

## 2023-10-02 RX ORDER — AMOXICILLIN 500 MG/1
500 CAPSULE ORAL 2 TIMES DAILY
Qty: 14 CAPSULE | Refills: 0 | Status: SHIPPED | OUTPATIENT
Start: 2023-10-02 | End: 2023-10-09

## 2023-10-02 RX ORDER — ALBUTEROL SULFATE 1.25 MG/3ML
1 SOLUTION RESPIRATORY (INHALATION) EVERY 6 HOURS PRN
Qty: 40 EACH | Refills: 12 | Status: SHIPPED | OUTPATIENT
Start: 2023-10-02

## 2023-10-02 RX ORDER — BUDESONIDE 180 UG/1
2 AEROSOL, POWDER RESPIRATORY (INHALATION)
Qty: 1 EACH | Refills: 11 | Status: SHIPPED | OUTPATIENT
Start: 2023-10-02

## 2023-10-02 NOTE — ASSESSMENT & PLAN NOTE
Amox 500mg PO bid x 7 days   Budesonide 2 puffs bid   Albuterol neb tx PRN   Continue daily antihistamine   Proceed to ED if s/s become severe   Notify office if s/s persist despite treatment

## 2023-10-02 NOTE — PROGRESS NOTES
"Chief Complaint  Cough (Pt went to urgent care on 2023 for cough and they prescribed zpak,albuterol,Claritin.pt states she is not getting any better.//Pt is 23 weeks pregnant.) and Establish Care    Subjective        Solange Guzmán presents to [facility]  History of Present Illness  New  patient/establish care:    Previous provider:    Lives in: [facility]     /single: , 2 daughters  Employed: self-employed     Nicotine/ETOH use: never smoker, denies ETOH use      , currently 23 weeks pregnant with 3rd child.     Pt went to  on  for cough. Dx with bronchitis and prescribed zpack. Pt states cough is not resolving. States she also has wheeze. Cough/wheeze is worse at night. Covid/flu were negative. Denies fever, chills, chest pain, N/V/D or other.     Objective   Vital Signs:  /86   Pulse 85   Ht 165.1 cm (65\")   Wt 114 kg (251 lb 12.8 oz)   SpO2 95%   BMI 41.90 kg/m²   Estimated body mass index is 41.9 kg/m² as calculated from the following:    Height as of this encounter: 165.1 cm (65\").    Weight as of this encounter: 114 kg (251 lb 12.8 oz).             Physical Exam  Vitals reviewed.   Constitutional:       General: She is not in acute distress.  HENT:      Head: Normocephalic.      Right Ear: Tympanic membrane normal.      Left Ear: Tympanic membrane normal.      Nose: Nose normal.      Mouth/Throat:      Pharynx: Oropharynx is clear. No posterior oropharyngeal erythema.   Eyes:      General: No scleral icterus.     Extraocular Movements: Extraocular movements intact.      Conjunctiva/sclera: Conjunctivae normal.      Pupils: Pupils are equal, round, and reactive to light.   Cardiovascular:      Rate and Rhythm: Normal rate and regular rhythm.      Pulses: Normal pulses.      Heart sounds: Normal heart sounds.   Pulmonary:      Effort: Pulmonary effort is normal.      Breath sounds: Examination of the right-upper field reveals wheezing. " Examination of the left-upper field reveals wheezing. Examination of the right-middle field reveals wheezing. Examination of the right-lower field reveals wheezing. Examination of the left-lower field reveals wheezing. Wheezing present.   Abdominal:      General: Bowel sounds are normal.      Palpations: Abdomen is soft.   Musculoskeletal:         General: Normal range of motion.      Cervical back: Neck supple.   Skin:     General: Skin is warm and dry.   Neurological:      Mental Status: She is alert and oriented to person, place, and time.   Psychiatric:         Mood and Affect: Mood normal.         Behavior: Behavior normal.         Thought Content: Thought content normal.         Judgment: Judgment normal.      Result Review :    Common labs          5/31/2023    10:35   Common Labs   WBC 5.16    Hemoglobin 13.5    Hematocrit 39.1    Platelets 244      Data reviewed : Consultant notes ob/gyn / UC 09/25             Assessment and Plan   Diagnoses and all orders for this visit:    1. Establishing care with new doctor, encounter for (Primary)    2. Class 3 severe obesity due to excess calories without serious comorbidity with body mass index (BMI) of 40.0 to 44.9 in adult  Assessment & Plan:  Patient's (Body mass index is 41.9 kg/m².) indicates that they are morbidly/severely obese (BMI > 40 or > 35 with obesity - related health condition) with health conditions that include none . Weight is unchanged. BMI  is above average; BMI management plan is completed. We discussed portion control and increasing exercise.       3. Pregnancy, unspecified gestational age  Assessment & Plan:  Keep all FU with OB/GYN         4. Acute cough    5. Bronchitis  Assessment & Plan:  Amox 500mg PO bid x 7 days   Budesonide 2 puffs bid   Albuterol neb tx PRN   Continue daily antihistamine   Proceed to ED if s/s become severe   Notify office if s/s persist despite treatment         Other orders  -     amoxicillin (AMOXIL) 500 MG capsule;  Take 1 capsule by mouth 2 (Two) Times a Day for 7 days.  Dispense: 14 capsule; Refill: 0  -     budesonide (Pulmicort Flexhaler) 180 MCG/ACT inhaler; Inhale 2 puffs 2 (Two) Times a Day.  Dispense: 1 each; Refill: 11  -     albuterol (ACCUNEB) 1.25 MG/3ML nebulizer solution; Take 3 mL by nebulization Every 6 (Six) Hours As Needed for Wheezing.  Dispense: 40 each; Refill: 12  -     Respiratory Therapy Supplies (Nebulizer Mask Adult) misc; Use 1 each Daily.  Dispense: 1 each; Refill: 2             Follow Up   Return if symptoms worsen or fail to improve.  Patient was given instructions and counseling regarding her condition or for health maintenance advice. Please see specific information pulled into the AVS if appropriate.

## 2023-10-02 NOTE — ASSESSMENT & PLAN NOTE
Patient's (Body mass index is 41.9 kg/m².) indicates that they are morbidly/severely obese (BMI > 40 or > 35 with obesity - related health condition) with health conditions that include none . Weight is unchanged. BMI  is above average; BMI management plan is completed. We discussed portion control and increasing exercise.

## 2023-10-03 ENCOUNTER — PATIENT ROUNDING (BHMG ONLY) (OUTPATIENT)
Dept: FAMILY MEDICINE CLINIC | Facility: CLINIC | Age: 28
End: 2023-10-03
Payer: COMMERCIAL

## 2023-10-03 ENCOUNTER — TELEPHONE (OUTPATIENT)
Dept: OBSTETRICS AND GYNECOLOGY | Facility: CLINIC | Age: 28
End: 2023-10-03
Payer: COMMERCIAL

## 2023-10-03 VITALS
HEIGHT: 65 IN | RESPIRATION RATE: 20 BRPM | SYSTOLIC BLOOD PRESSURE: 115 MMHG | HEART RATE: 85 BPM | OXYGEN SATURATION: 95 % | DIASTOLIC BLOOD PRESSURE: 68 MMHG | TEMPERATURE: 97.7 F | WEIGHT: 252.65 LBS | BODY MASS INDEX: 42.09 KG/M2

## 2023-10-03 LAB
QT INTERVAL: 368 MS
QTC INTERVAL: 417 MS

## 2023-10-03 PROCEDURE — 25010000002 METHYLPREDNISOLONE PER 125 MG: Performed by: NURSE PRACTITIONER

## 2023-10-03 PROCEDURE — 94640 AIRWAY INHALATION TREATMENT: CPT

## 2023-10-03 PROCEDURE — 94799 UNLISTED PULMONARY SVC/PX: CPT

## 2023-10-03 PROCEDURE — 96372 THER/PROPH/DIAG INJ SC/IM: CPT

## 2023-10-03 RX ORDER — IPRATROPIUM BROMIDE AND ALBUTEROL SULFATE 2.5; .5 MG/3ML; MG/3ML
SOLUTION RESPIRATORY (INHALATION)
Status: COMPLETED
Start: 2023-10-03 | End: 2023-10-03

## 2023-10-03 RX ORDER — METHYLPREDNISOLONE SODIUM SUCCINATE 125 MG/2ML
80 INJECTION, POWDER, LYOPHILIZED, FOR SOLUTION INTRAMUSCULAR; INTRAVENOUS ONCE
Status: COMPLETED | OUTPATIENT
Start: 2023-10-03 | End: 2023-10-03

## 2023-10-03 RX ORDER — PREDNISONE 20 MG/1
60 TABLET ORAL DAILY
Qty: 15 TABLET | Refills: 0 | Status: SHIPPED | OUTPATIENT
Start: 2023-10-03 | End: 2023-10-08

## 2023-10-03 RX ADMIN — IPRATROPIUM BROMIDE AND ALBUTEROL SULFATE 3 ML: .5; 3 SOLUTION RESPIRATORY (INHALATION) at 00:18

## 2023-10-03 RX ADMIN — METHYLPREDNISOLONE SODIUM SUCCINATE 80 MG: 125 INJECTION INTRAMUSCULAR; INTRAVENOUS at 01:18

## 2023-10-03 NOTE — DISCHARGE INSTRUCTIONS
Use your home nebs and inhalers and take your amoxicillin as prescribed today.    Talk to Dr. Deleon in the morning and if he is okay with that go ahead and take steroids as prescribed.  You did get an initial dose in the emergency department to try to help with some inflammation.      Continue Claritin    Return for new or worsening symptoms

## 2023-10-03 NOTE — TELEPHONE ENCOUNTER
Patient was seen at the ER yesterday for a severe URI. She was given a script for an oral prednisone to take but was told to check with her OB before starting it. She wants to know if she should start that. She was given a steroid injection while there. She states she is already starting to see some improvement since seen. Can/should she take the oral steroid? Please advise.

## 2023-10-03 NOTE — ED PROVIDER NOTES
Time: 8:47 PM EDT  Date of encounter:  10/2/2023  Independent Historian/Clinical History and Information was obtained by:   Patient    History is limited by: N/A    Chief Complaint   Patient presents with    Cough    Shortness of Breath         History of Present Illness:  Patient is a 28 y.o. year old female who presents to the emergency department for evaluation of shortness of breath and cough.  nonproductive cough.  Went to urgent care September 25, COVID, strep and flu negative.  Patient is currently 24 weeks pregnant.  Denies recent travel.  Patient has been diagnosed previously with bronchitis and given inhalers as well as antihistamines.  She has seen her OB/GYN and urgent care both.  She states she gets to coughing so much that she cannot catch her breath.  Patient reports wheezing.  No fever.  Nonproductive cough.  No other URI symptoms.  Complains of chest tightness when this occurs.  No significant leg swelling.  Patient denies history of asthma.  She states she was just recently given a nebulizer and then ordered an inhaler but the pharmacy was out.  Took a Z-Jim over a week ago and was placed on amoxicillin yesterday    HPI    Patient Care Team  Primary Care Provider: Chastity Camargo APRN    Past Medical History:     No Known Allergies  Past Medical History:   Diagnosis Date    Urinary tract infection      Past Surgical History:   Procedure Laterality Date    ULNAR NERVE REPAIR  02/12/2016    ULNAR NERVE TRANSPOSITION Right 12/14/2015    WISDOM TOOTH EXTRACTION  01/2015     Family History   Problem Relation Age of Onset    No Known Problems Father     No Known Problems Mother     Ovarian cancer Neg Hx     Uterine cancer Neg Hx     Breast cancer Neg Hx     Colon cancer Neg Hx     Prostate cancer Neg Hx     Clotting disorder Neg Hx     Pulmonary embolism Neg Hx     Deep vein thrombosis Neg Hx        Home Medications:  Prior to Admission medications    Medication Sig Start Date End Date Taking?  Authorizing Provider   albuterol (ACCUNEB) 1.25 MG/3ML nebulizer solution Take 3 mL by nebulization Every 6 (Six) Hours As Needed for Wheezing. 10/2/23   Chastity Camargo APRN   albuterol sulfate  (90 Base) MCG/ACT inhaler Inhale 2 puffs Every 4 (Four) Hours As Needed for Wheezing. 9/25/23   Tana Garcia APRN   amoxicillin (AMOXIL) 500 MG capsule Take 1 capsule by mouth 2 (Two) Times a Day for 7 days. 10/2/23 10/9/23  Chastity Camargo APRN   azithromycin (Zithromax Z-Jim) 250 MG tablet Take 2 tablets by mouth on day 1, then 1 tablet daily on days 2-5 9/25/23   Tana Garcia APRN   budesonide (Pulmicort Flexhaler) 180 MCG/ACT inhaler Inhale 2 puffs 2 (Two) Times a Day. 10/2/23   Chastity Camargo APRN   loratadine (CLARITIN) 10 MG tablet Take 1 tablet by mouth Daily. 9/25/23   Tana Garcia APRN   Prenatal Vit-Fe Fumarate-FA (prenatal vitamin 27-0.8) 27-0.8 MG tablet tablet Take 1 tablet by mouth Daily.    Provider, MD Luke   Respiratory Therapy Supplies (Nebulizer Mask Adult) misc Use 1 each Daily. 10/2/23   Chastity Camargo APRN   Wheat Dextrin (BENEFIBER PO) Take  by mouth.    Provider, Historical, MD        Social History:   Social History     Tobacco Use    Smoking status: Never    Smokeless tobacco: Never   Vaping Use    Vaping Use: Never used   Substance Use Topics    Alcohol use: Yes     Comment: Maybe one drink every 6 months    Drug use: Never         Review of Systems:  Review of Systems   Constitutional:  Negative for chills and fever.   HENT:  Negative for congestion, ear pain, rhinorrhea, sinus pressure, sinus pain, sneezing and sore throat.    Eyes:  Negative for pain.   Respiratory:  Positive for cough, chest tightness, shortness of breath and wheezing.    Cardiovascular:  Negative for chest pain, palpitations and leg swelling.   Gastrointestinal:  Negative for abdominal pain, diarrhea, nausea and vomiting.  "  Genitourinary:  Negative for flank pain and hematuria.   Musculoskeletal:  Negative for joint swelling.   Skin:  Negative for pallor.   Neurological:  Negative for seizures and headaches.   Hematological: Negative.    Psychiatric/Behavioral: Negative.     All other systems reviewed and are negative.     Physical Exam:  /68 (BP Location: Left arm, Patient Position: Sitting)   Pulse 85   Temp 97.7 °F (36.5 °C) (Oral)   Resp 20   Ht 165.1 cm (65\")   Wt 115 kg (252 lb 10.4 oz)   LMP 03/22/2023   SpO2 95%   BMI 42.04 kg/m²         Physical Exam  HENT:      Head: Normocephalic and atraumatic.      Right Ear: Hearing, tympanic membrane, ear canal and external ear normal.      Left Ear: Hearing, tympanic membrane, ear canal and external ear normal.      Nose: Nose normal.      Mouth/Throat:      Mouth: Mucous membranes are moist.      Pharynx: Uvula midline. No pharyngeal swelling or posterior oropharyngeal erythema.   Eyes:      Pupils: Pupils are equal, round, and reactive to light.   Cardiovascular:      Rate and Rhythm: Normal rate and regular rhythm.      Heart sounds: Normal heart sounds.   Pulmonary:      Effort: Pulmonary effort is normal.      Breath sounds: Wheezing present.   Abdominal:      General: There is no distension.      Tenderness: There is no abdominal tenderness.   Musculoskeletal:         General: Normal range of motion.      Cervical back: Neck supple.      Right lower leg: No edema.      Left lower leg: No edema.   Skin:     General: Skin is warm and dry.   Neurological:      General: No focal deficit present.      Mental Status: She is alert and oriented to person, place, and time.   Psychiatric:         Mood and Affect: Mood normal.         Behavior: Behavior normal.              Medical Decision Making:      Comorbidities that affect care:    None    External Notes reviewed:    Previous Clinic Note: Follow-up with PCP on October 2 for establishment of care with a new PCP.  " Diagnosed with bronchitis      The following orders were placed and all results were independently analyzed by me:  Orders Placed This Encounter   Procedures    XR Chest 1 View    Comprehensive Metabolic Panel    BNP    CBC Auto Differential    ECG 12 Lead Dyspnea    CBC & Differential       Medications Given in the Emergency Department:  Medications   ipratropium-albuterol (DUO-NEB) 0.5-2.5 mg/3 ml nebulizer solution  - ADS Override Pull (3 mL  Given 10/3/23 0018)   methylPREDNISolone sodium succinate (SOLU-Medrol) injection 80 mg (80 mg Intramuscular Given 10/3/23 0118)        ED Course:    The patient was initially evaluated in the triage area where orders were placed. The patient was later dispositioned by [facility].      The patient was advised to stay for completion of workup which includes but is not limited to communication of labs and radiological results, reassessment and plan. The patient was advised that leaving prior to disposition by a provider could result in critical findings that are not communicated to the patient.     ED Course as of 10/03/23 0636   Mon Oct 02, 2023   2049 --- PROVIDER IN TRIAGE NOTE ---    The patient was evaluated by Mingo plata in triage. Orders were placed and the patient is currently awaiting disposition.    [AJ]   Tue Oct 03, 2023   0000 XR Chest 1 View  negative [DS]   0047 Patient still has mild wheezing after nebs but is improved [DS]      ED Course User Index  [AJ] Mingo Eason PA-C  [DS] Karly Salcedo APRN       Labs:    Lab Results (last 24 hours)       Procedure Component Value Units Date/Time    CBC & Differential [[id number]]  (Abnormal) Collected: 10/02/23 2105    Specimen: Blood from Arm, Right Updated: 10/02/23 2115    Narrative:      The following orders were created for panel order CBC & Differential.  Procedure                               Abnormality         Status                     ---------                                -----------         ------                     CBC Auto Differential[[id number]]        Abnormal            Final result                 Please view results for these tests on the individual orders.    Comprehensive Metabolic Panel [[id number]]  (Abnormal) Collected: 10/02/23 2105    Specimen: Blood from Arm, Right Updated: 10/02/23 2148     Glucose 99 mg/dL      BUN 8 mg/dL      Creatinine 0.59 mg/dL      Sodium 139 mmol/L      Potassium 3.5 mmol/L      Chloride 107 mmol/L      CO2 21.1 mmol/L      Calcium 9.8 mg/dL      Total Protein 6.4 g/dL      Albumin 3.6 g/dL      ALT (SGPT) 12 U/L      AST (SGOT) 12 U/L      Alkaline Phosphatase 77 U/L      Total Bilirubin 0.2 mg/dL      Globulin 2.8 gm/dL      A/G Ratio 1.3 g/dL      BUN/Creatinine Ratio 13.6     Anion Gap 10.9 mmol/L      eGFR 127.7 mL/min/1.73     Narrative:      GFR Normal >60  Chronic Kidney Disease <60  Kidney Failure <15      BNP [[id number]]  (Normal) Collected: 10/02/23 2105    Specimen: Blood from Arm, Right Updated: 10/02/23 2143     proBNP <36.0 pg/mL     Narrative:      This assay is used as an aid in the diagnosis of individuals suspected of having heart failure. It can be used as an aid in the diagnosis of acute decompensated heart failure (ADHF) in patients presenting with signs and symptoms of ADHF to the emergency department (ED). In addition, NT-proBNP of <300 pg/mL indicates ADHF is not likely.    CBC Auto Differential [[id number]]  (Abnormal) Collected: 10/02/23 2105    Specimen: Blood from Arm, Right Updated: 10/02/23 2115     WBC 9.53 10*3/mm3      RBC 3.93 10*6/mm3      Hemoglobin 11.5 g/dL      Hematocrit 33.5 %      MCV 85.2 fL      MCH 29.3 pg      MCHC 34.3 g/dL      RDW 14.3 %      RDW-SD 44.8 fl      MPV 9.7 fL      Platelets 226 10*3/mm3      Neutrophil % 68.7 %      Lymphocyte % 19.0 %      Monocyte % 4.9 %      Eosinophil % 6.4 %      Basophil % 0.7 %      Immature Grans % 0.3 %      Neutrophils, Absolute 6.54 10*3/mm3       Lymphocytes, Absolute 1.81 10*3/mm3      Monocytes, Absolute 0.47 10*3/mm3      Eosinophils, Absolute 0.61 10*3/mm3      Basophils, Absolute 0.07 10*3/mm3      Immature Grans, Absolute 0.03 10*3/mm3      nRBC 0.0 /100 WBC              Imaging:    XR Chest 1 View    Result Date: 10/2/2023  PROCEDURE: XR CHEST 1 VW  COMPARISON: None.  INDICATIONS: cough x 3 weeks  FINDINGS: A single frontal (AP or PA upright portable) chest radiograph reveals no cardiac enlargement and no acute infiltrate. No pneumothorax is seen.  The imaged airway is patent and midline.       No acute cardiopulmonary disease is seen radiographically.      ANNABELLE VILLAGOMEZ JR, MD       Electronically Signed and Approved By: ANNABELLE VILLAGOMEZ JR, MD on 10/02/2023 at 23:22                Differential Diagnosis and Discussion:      Cough: Differential diagnosis includes but is not limited to pneumonia, acute bronchitis, upper respiratory infection, ACE inhibitor use, allergic reaction, epiglottitis, seasonal allergies, chemical irritants, exercise-induced asthma, viral syndrome.    All labs were reviewed and interpreted by me.  All X-rays impressions were independently interpreted by me.    MDM  Number of Diagnoses or Management Options  Bronchitis  Diagnosis management comments: The patient presents to the ED with a cough. The patient is resting comfortably and feels better, is alert and in no distress.  On re-examination the patient does not appear toxic and has no meningeal signs (including a negative Kernig and Brudzinski sign), and there's no intractable vomiting, respiratory distress and no apparent pain. Based on the history, exam, diagnostic testing and reassessment, the patient has no signs of meningitis, significant pneumonia, pyelonephritis, sepsis or other acute serious bacterial infections, or other significant pathology to warrant further testing, continued ED treatment, admission or specialist evaluation. The patient's vital signs have been  stable. The patient's condition is stable and is appropriate for discharge. The patient´s symptoms are consistent with a viral upper respiratory infection and antibiotics are not indicated. The patient was counseled to return to the ED for re-evaluation for worsening cough, shortness of breath, uncontrollable headache, uncontrollable fever, altered mental status, or any symptoms which cause them concern. The patient will pursue further outpatient evaluation with the primary care physician or other designated or consultant physician as indicated in the discharge instructions.               Amount and/or Complexity of Data Reviewed  Clinical lab tests: reviewed and ordered  Tests in the radiology section of CPT®: reviewed and ordered  Tests in the medicine section of CPT®: reviewed and ordered    Risk of Complications, Morbidity, and/or Mortality  Presenting problems: moderate  Diagnostic procedures: low  Management options: low    Patient Progress  Patient progress: stable         Patient Care Considerations:    ANTIBIOTICS: I considered prescribing antibiotics as an outpatient however patient was already prescribed some by her PCP.  Chest x-ray is negative and did not show any infiltrates      Consultants/Shared Management Plan:    None    Social Determinants of Health:    Patient is independent, reliable, and has access to care.       Disposition and Care Coordination:    Discharged: I considered escalation of care by admitting this patient for observation, however the patient has improved and is suitable and  stable for discharge.    I have explained the patient´s condition, diagnoses and treatment plan based on the information available to me at this time. I have answered questions and addressed any concerns. The patient has a good  understanding of the patient´s diagnosis, condition, and treatment plan as can be expected at this point. The vital signs have been stable. The patient´s condition is stable and  appropriate for discharge from the emergency department.      The patient will pursue further outpatient evaluation with the primary care physician or other designated or consulting physician as outlined in the discharge instructions. They are agreeable to this plan of care and follow-up instructions have been explained in detail. The patient has received these instructions in written format and have expressed an understanding of the discharge instructions. The patient is aware that any significant change in condition or worsening of symptoms should prompt an immediate return to this or the closest emergency department or call to 911.  I have explained discharge medications and the need for follow up with the patient/caretakers. This was also printed in the discharge instructions. Patient was discharged with the following medications and follow up:      Medication List        New Prescriptions      predniSONE 20 MG tablet  Commonly known as: DELTASONE  Take 3 tablets by mouth Daily for 5 days.               Where to Get Your Medications        These medications were sent to [facility] - [place] - [address] - [phone]  - [phone] FX  [address], [place] [address]      Phone: [phone]   predniSONE 20 MG tablet      Dylan Deleon MD  [facility] [place] [address]  [phone]    In 1 day         Final diagnoses:   Bronchitis        ED Disposition       ED Disposition   Discharge    Condition   Stable    Comment   --               This medical record created using voice recognition software.             Karly Salcedo APRN  10/03/23 0636

## 2023-10-03 NOTE — PROGRESS NOTES
A My-Chart message has been sent to the patient for PATIENT ROUNDING with [facility].

## 2023-10-18 ENCOUNTER — ROUTINE PRENATAL (OUTPATIENT)
Dept: OBSTETRICS AND GYNECOLOGY | Facility: CLINIC | Age: 28
End: 2023-10-18
Payer: COMMERCIAL

## 2023-10-18 VITALS — SYSTOLIC BLOOD PRESSURE: 113 MMHG | DIASTOLIC BLOOD PRESSURE: 76 MMHG

## 2023-10-18 DIAGNOSIS — O99.210 OBESITY AFFECTING PREGNANCY, ANTEPARTUM, UNSPECIFIED OBESITY TYPE: ICD-10-CM

## 2023-10-18 DIAGNOSIS — Z34.80 SUPERVISION OF OTHER NORMAL PREGNANCY, ANTEPARTUM: Primary | ICD-10-CM

## 2023-10-18 PROBLEM — Z34.90 PREGNANCY: Status: RESOLVED | Noted: 2023-10-02 | Resolved: 2023-10-18

## 2023-10-18 LAB
DEPRECATED RDW RBC AUTO: 41.5 FL (ref 37–54)
ERYTHROCYTE [DISTWIDTH] IN BLOOD BY AUTOMATED COUNT: 13 % (ref 12.3–15.4)
GLUCOSE 1H P GLC SERPL-MCNC: 145 MG/DL (ref 65–139)
GLUCOSE UR STRIP-MCNC: NEGATIVE MG/DL
HCT VFR BLD AUTO: 35.1 % (ref 34–46.6)
HGB BLD-MCNC: 11.8 G/DL (ref 12–15.9)
MCH RBC QN AUTO: 29.4 PG (ref 26.6–33)
MCHC RBC AUTO-ENTMCNC: 33.6 G/DL (ref 31.5–35.7)
MCV RBC AUTO: 87.5 FL (ref 79–97)
PLATELET # BLD AUTO: 184 10*3/MM3 (ref 140–450)
PMV BLD AUTO: 9.9 FL (ref 6–12)
PROT UR STRIP-MCNC: NEGATIVE MG/DL
RBC # BLD AUTO: 4.01 10*6/MM3 (ref 3.77–5.28)
WBC NRBC COR # BLD: 8.32 10*3/MM3 (ref 3.4–10.8)

## 2023-10-18 PROCEDURE — 85027 COMPLETE CBC AUTOMATED: CPT | Performed by: OBSTETRICS & GYNECOLOGY

## 2023-10-18 PROCEDURE — 82950 GLUCOSE TEST: CPT | Performed by: OBSTETRICS & GYNECOLOGY

## 2023-10-18 NOTE — PROGRESS NOTES
OB FOLLOW UP    CC: Scheduled OB routine FU     Prenatal care complicated by:   Patient Active Problem List   Diagnosis    Obesity without serious comorbidity    Bronchitis    Supervision of other normal pregnancy, antepartum    Obesity affecting pregnancy, antepartum       Subjective:   Patient has: No complaints, No leaking fluid, No vaginal bleeding, No contractions, Adequate FM    Objective:  Urine glucose/protein- see flow sheet      /76   LMP 2023   See OB flow for LE edema, and cvx exam if applicable  FHT: 152 BPM   Uterine Size:  25 cm      Assessment and Plan:  Diagnoses and all orders for this visit:    1. Supervision of other normal pregnancy, antepartum (Primary)  Overview:  EDC: 2024    COVID-19 vaccine: Recommended  Flu vaccine: Recommended, Rx 10/18/2023  Tdap vaccine: Rx 10/18/2023  RSV vaccine: Rx 10/18/2023    Assessment & Plan:  1 hour GTT today  Tdap and RSV vaccine prescriptions given today.  Flu vaccine prescription given today.  Continue prenatal vitamins  Fetal kick counts   labor warnings    Orders:  -     POC Urinalysis Dipstick  -     CBC (No Diff)  -     Gestational Diabetes Screen 1 Hour    2. Obesity affecting pregnancy, antepartum, unspecified obesity type  Assessment & Plan:  Discussed exercise, nutrition and appropriate weight gain in pregnancy.            25w5d  Reassuring pregnancy progress    Counseling: OB precautions, leaking, VB, beth rai vs PTL/Labor  FKC    Questions answered    Return in about 2 weeks (around 2023) for Recheck.      Dylan Deleon MD  10/18/2023

## 2023-10-18 NOTE — PATIENT INSTRUCTIONS
Venipuncture Blood Specimen Collection  Venipuncture performed in left arm by Kira Koehler with good hemostasis. Patient tolerated the procedure well without complications.   10/18/23   Kira Koehler

## 2023-10-18 NOTE — ASSESSMENT & PLAN NOTE
1 hour GTT today  Tdap and RSV vaccine prescriptions given today.  Flu vaccine prescription given today.  Continue prenatal vitamins  Fetal kick counts   labor warnings

## 2023-10-19 DIAGNOSIS — Z34.80 SUPERVISION OF OTHER NORMAL PREGNANCY, ANTEPARTUM: Primary | ICD-10-CM

## 2023-10-19 NOTE — PROGRESS NOTES
OB FOLLOW UP    CC: Scheduled OB routine FU     Prenatal care complicated by:   Patient Active Problem List   Diagnosis    Obesity without serious comorbidity    Bronchitis    Supervision of other normal pregnancy, antepartum    Obesity affecting pregnancy, antepartum       Subjective:   Patient has: No complaints, No leaking fluid, No vaginal bleeding, No contractions, Adequate FM    Objective:  Urine glucose/protein- see flow sheet      /76   LMP 2023   See OB flow for LE edema, and cvx exam if applicable  FHT: 152 BPM   Uterine Size:  25 cm      Assessment and Plan:  Diagnoses and all orders for this visit:    1. Supervision of other normal pregnancy, antepartum (Primary)  Overview:  EDC: 2024    COVID-19 vaccine: Recommended  Flu vaccine: Recommended, Rx 10/18/2023  Tdap vaccine: Rx 10/18/2023  RSV vaccine: Rx 10/18/2023    Assessment & Plan:  1 hour GTT today  Tdap and RSV vaccine prescriptions given today.  Flu vaccine prescription given today.  Continue prenatal vitamins  Fetal kick counts   labor warnings    Orders:  -     POC Urinalysis Dipstick  -     CBC (No Diff)  -     Gestational Diabetes Screen 1 Hour    2. Obesity affecting pregnancy, antepartum, unspecified obesity type  Assessment & Plan:  Discussed exercise, nutrition and appropriate weight gain in pregnancy.            25w6d  Reassuring pregnancy progress    Counseling: OB precautions, leaking, VB, beth rai vs PTL/Labor  FKC    Questions answered    Return in about 2 weeks (around 2023) for Recheck.      Dylan Deleon MD  10/18/2023

## 2023-10-31 NOTE — PROGRESS NOTES
Routine Prenatal Visit     Subjective  Solange Guzmán is a 28 y.o.  at 27w5d here for her routine OB visit.   She is taking her prenatal vitamins.Reports no loss of fluid or vaginal bleeding. Patient doing well without any complaints. Pregnancy is complicated by:     Patient Active Problem List   Diagnosis    Obesity without serious comorbidity    Bronchitis    Supervision of other normal pregnancy, antepartum    Obesity affecting pregnancy, antepartum         OB History    Para Term  AB Living   3 2 2     2   SAB IAB Ectopic Molar Multiple Live Births           0 2      # Outcome Date GA Lbr Jt/2nd Weight Sex Delivery Anes PTL Lv   3 Current            2 Term 21 39w1d 06:47 / 00:57 4190 g (9 lb 3.8 oz) F Vag-Spont EPI N HADLEY   1 Term 20 37w0d  3600 g (7 lb 15 oz) F Vag-Spont  N HADLEY           ROS:   General ROS: negative for - chills or fatigue  Respiratory ROS: negative for - cough or hemoptysis  Cardiovascular ROS: negative for - chest pain or dyspnea on exertion  Genito-Urinary ROS: negative for  change in urinary stream, vaginal discharge   Musculoskeletal ROS: negative for - gait disturbance or joint pain  Dermatological ROS: negative for acne,  dry skin or itching    Objective  Physical Exam:   Vitals:    23 0856   BP: 110/76       Uterine Size: size equals dates  FHT: 110-160 BPM    General appearance - alert, well appearing, and in no distress  Mental status - alert, oriented to person, place, and time  Abdomen- Soft, Gravid uterus, non-tender to palpation  Musculoskeletal: negative for - gait disturbance or joint pain  Extremeties: negative swelling or cyanosis   Dermatological: negative rashes or skin lesions       Assessment/Plan:   Diagnoses and all orders for this visit:    1. Supervision of other normal pregnancy, antepartum (Primary)  Assessment & Plan:  KEEGAN finalize:Estimated Date of Delivery: 24    Genetic testing (NIPS-Quad)/CF/AFP:  Declined      COVID-19 vaccine: Recommended  Flu vaccine: Recommended, Rx 10/18/2023  Tdap vaccine: Rx 10/18/2023  RSV vaccine: Rx 10/18/2023    Rhogam: A Positive    Sterilization:none     Anatomy US:Growth 53%, AC 50%, consistent with dates,  FHTS 148, all anatomy seen and WNL, Anterior placenta-grade 1, 3VC with normal insertion, vertex, male  FU US:    EPDS: ?    PROBLEM LIST/PLAN:    Obesity-BMI >40, ASA 81mg daily,  testing   Abnormal 1 hr GTT, 3 hr ordered     Orders:  -     POC Urinalysis Dipstick    2. Class 3 severe obesity due to excess calories without serious comorbidity in adult, unspecified BMI  Assessment & Plan:  ASA 81mg daily    testing 34 weeks             Counseling:   OB precautions, leaking, VB, beth rai vs PTL/Labor  FKC  HTN precautions reviewed: HA, vision change, RUQ/epigastric pain, edema  Round Ligament Pain:  The uterus has several ligaments which provide support and keep the uterus in place. As the  uterus grows these ligaments are pulled and stretched which often causes sharp stabbing like pain in the inguinal area.   You may find a pregnancy support band helpful. Changing positions may also help. Yoga is a great way to cope with round ligament and low back pain in pregnancy.    Massage may also help with low back pain   Things to Consider at this Point in your Pregnancy:  Some women experience swelling in their feet during pregnancy. Compression stockings may help  Drink plenty of water and stay active   Make sure you are eating frequent small meals, nuts are a wonderful snack to keep with you            Return in about 2 weeks (around 11/15/2023) for Routine OB visit.      We have gone over prenatal care to include the timing and content of visits. I informed her how to contact the office and/or on call person in the event of any problems and encouraged her to do so when she feels it is necessary.  We then spent time answering her questions which she indicated were  answered to her satisfaction.    Sonia Lawler DO  11/1/2023 09:07 EDT

## 2023-10-31 NOTE — ASSESSMENT & PLAN NOTE
KEEGAN finalize:Estimated Date of Delivery: 24    Genetic testing (NIPS-Quad)/CF/AFP:  Declined     COVID-19 vaccine: Recommended  Flu vaccine: Recommended, Rx 10/18/2023  Tdap vaccine: Rx 10/18/2023  RSV vaccine: Rx 10/18/2023    Rhogam: A Positive    Sterilization:none     Anatomy US:Growth 53%, AC 50%, consistent with dates,  FHTS 148, all anatomy seen and WNL, Anterior placenta-grade 1, 3VC with normal insertion, vertex, male  FU US:    EPDS: ?    PROBLEM LIST/PLAN:    Obesity-BMI >40, ASA 81mg daily,  testing   Abnormal 1 hr GTT, 3 hr ordered

## 2023-11-01 ENCOUNTER — ROUTINE PRENATAL (OUTPATIENT)
Dept: OBSTETRICS AND GYNECOLOGY | Facility: CLINIC | Age: 28
End: 2023-11-01
Payer: COMMERCIAL

## 2023-11-01 VITALS — BODY MASS INDEX: 42.4 KG/M2 | SYSTOLIC BLOOD PRESSURE: 110 MMHG | DIASTOLIC BLOOD PRESSURE: 76 MMHG | WEIGHT: 254.8 LBS

## 2023-11-01 DIAGNOSIS — E66.01 CLASS 3 SEVERE OBESITY DUE TO EXCESS CALORIES WITHOUT SERIOUS COMORBIDITY IN ADULT, UNSPECIFIED BMI: ICD-10-CM

## 2023-11-01 DIAGNOSIS — Z34.80 SUPERVISION OF OTHER NORMAL PREGNANCY, ANTEPARTUM: Primary | ICD-10-CM

## 2023-11-01 LAB
GLUCOSE UR STRIP-MCNC: NEGATIVE MG/DL
PROT UR STRIP-MCNC: NEGATIVE MG/DL

## 2023-11-02 ENCOUNTER — LAB (OUTPATIENT)
Dept: LAB | Facility: HOSPITAL | Age: 28
End: 2023-11-02
Payer: COMMERCIAL

## 2023-11-02 DIAGNOSIS — Z34.80 SUPERVISION OF OTHER NORMAL PREGNANCY, ANTEPARTUM: ICD-10-CM

## 2023-11-02 LAB
GLUCOSE 1H P 100 G GLC PO SERPL-MCNC: 176 MG/DL (ref 74–180)
GLUCOSE 2H P 100 G GLC PO SERPL-MCNC: 84 MG/DL (ref 74–155)
GLUCOSE 3H P 100 G GLC PO SERPL-MCNC: 62 MG/DL (ref 74–140)
GLUCOSE BLDC GLUCOMTR-MCNC: 88 MG/DL (ref 70–99)
GLUCOSE P FAST SERPL-MCNC: 91 MG/DL (ref 74–106)

## 2023-11-02 PROCEDURE — 82951 GLUCOSE TOLERANCE TEST (GTT): CPT

## 2023-11-02 PROCEDURE — 82952 GTT-ADDED SAMPLES: CPT

## 2023-11-02 PROCEDURE — 36415 COLL VENOUS BLD VENIPUNCTURE: CPT

## 2023-11-03 ENCOUNTER — OFFICE VISIT (OUTPATIENT)
Dept: FAMILY MEDICINE CLINIC | Facility: CLINIC | Age: 28
End: 2023-11-03
Payer: COMMERCIAL

## 2023-11-03 ENCOUNTER — TELEPHONE (OUTPATIENT)
Dept: OBSTETRICS AND GYNECOLOGY | Facility: CLINIC | Age: 28
End: 2023-11-03
Payer: COMMERCIAL

## 2023-11-03 VITALS
BODY MASS INDEX: 42.92 KG/M2 | WEIGHT: 257.6 LBS | DIASTOLIC BLOOD PRESSURE: 74 MMHG | HEIGHT: 65 IN | OXYGEN SATURATION: 93 % | HEART RATE: 91 BPM | SYSTOLIC BLOOD PRESSURE: 117 MMHG | TEMPERATURE: 98 F

## 2023-11-03 DIAGNOSIS — Z34.80 SUPERVISION OF OTHER NORMAL PREGNANCY, ANTEPARTUM: ICD-10-CM

## 2023-11-03 DIAGNOSIS — O99.210 OBESITY AFFECTING PREGNANCY, ANTEPARTUM, UNSPECIFIED OBESITY TYPE: Primary | ICD-10-CM

## 2023-11-03 DIAGNOSIS — R05.9 COUGH, UNSPECIFIED TYPE: ICD-10-CM

## 2023-11-03 DIAGNOSIS — R06.2 WHEEZING: ICD-10-CM

## 2023-11-03 DIAGNOSIS — E66.01 CLASS 3 SEVERE OBESITY DUE TO EXCESS CALORIES WITHOUT SERIOUS COMORBIDITY WITH BODY MASS INDEX (BMI) OF 40.0 TO 44.9 IN ADULT: ICD-10-CM

## 2023-11-03 DIAGNOSIS — J40 BRONCHITIS: ICD-10-CM

## 2023-11-03 RX ORDER — AMOXICILLIN 500 MG/1
500 CAPSULE ORAL 2 TIMES DAILY
Qty: 14 CAPSULE | Refills: 0 | Status: SHIPPED | OUTPATIENT
Start: 2023-11-03 | End: 2023-11-10

## 2023-11-03 RX ORDER — ALBUTEROL SULFATE 90 UG/1
2 AEROSOL, METERED RESPIRATORY (INHALATION) EVERY 4 HOURS PRN
Qty: 18 G | Refills: 0 | Status: SHIPPED | OUTPATIENT
Start: 2023-11-03

## 2023-11-03 RX ORDER — MONTELUKAST SODIUM 10 MG/1
10 TABLET ORAL NIGHTLY
Qty: 30 TABLET | Refills: 5 | Status: SHIPPED | OUTPATIENT
Start: 2023-11-03

## 2023-11-03 NOTE — TELEPHONE ENCOUNTER
----- Message from Dylan Deleon MD sent at 11/3/2023  7:13 AM EDT -----  Please notify the patient that her 3 hour gtt was normal

## 2023-11-03 NOTE — PROGRESS NOTES
"Chief Complaint  Follow-up (Pt was seen here in office on 10/02/2023 for bronchitis.pt was prescribed zpak,albuterol and also went to ED the same day, given a solu medrol injection and sent home with prednisone. Pt has only feels better during the day, worse at night. Currently 28 wks pregnant.)    Subjective        Solange Guzmán presents to [facility]  History of Present Illness  Pt presents with continued cough/wheezing x 1 month. Pt is currently 28weeks preg. States she has taken zpack, prednisone, steroid injection, albuterol neb txs, pulmicort, and albuterol inhaler without success. She also take daily zyrtec. Denies fever, chills, chest pain, nasal congestion, HA, N/V/D, abd pain, GERD, or other. No known sick contacts.     Reviewed all recent labs and medications.      Objective   Vital Signs:  /74   Pulse 91   Temp 98 °F (36.7 °C) (Temporal)   Ht 165.1 cm (65\")   Wt 117 kg (257 lb 9.6 oz)   SpO2 93%   BMI 42.87 kg/m²   Estimated body mass index is 42.87 kg/m² as calculated from the following:    Height as of this encounter: 165.1 cm (65\").    Weight as of this encounter: 117 kg (257 lb 9.6 oz).               Physical Exam  Vitals reviewed.   Constitutional:       General: She is not in acute distress.     Appearance: Normal appearance.   HENT:      Head: Normocephalic.      Right Ear: Tympanic membrane normal.      Left Ear: Tympanic membrane normal.      Nose: Nose normal.      Mouth/Throat:      Pharynx: Oropharynx is clear. No posterior oropharyngeal erythema.   Eyes:      General: No scleral icterus.     Extraocular Movements: Extraocular movements intact.      Conjunctiva/sclera: Conjunctivae normal.      Pupils: Pupils are equal, round, and reactive to light.   Cardiovascular:      Rate and Rhythm: Normal rate and regular rhythm.      Pulses: Normal pulses.      Heart sounds: Normal heart sounds.   Pulmonary:      Effort: Pulmonary effort is normal. "      Breath sounds: Examination of the right-upper field reveals wheezing. Examination of the left-upper field reveals wheezing. Examination of the right-middle field reveals wheezing. Wheezing present.   Abdominal:      General: Bowel sounds are normal.      Palpations: Abdomen is soft.   Musculoskeletal:         General: Normal range of motion.      Cervical back: Neck supple.   Skin:     General: Skin is warm and dry.   Neurological:      Mental Status: She is alert and oriented to person, place, and time.   Psychiatric:         Mood and Affect: Mood normal.         Behavior: Behavior normal.         Thought Content: Thought content normal.         Judgment: Judgment normal.        Result Review :    Common labs          5/31/2023    10:35 10/2/2023    21:05 10/18/2023    11:53   Common Labs   Glucose  99     BUN  8     Creatinine  0.59     Sodium  139     Potassium  3.5     Chloride  107     Calcium  9.8     Albumin  3.6     Total Bilirubin  0.2     Alkaline Phosphatase  77     AST (SGOT)  12     ALT (SGPT)  12     WBC 5.16  9.53  8.32    Hemoglobin 13.5  11.5  11.8    Hematocrit 39.1  33.5  35.1    Platelets 244  226  184      Data reviewed : Radiologic studies cxr and Consultant notes ob/gyn  x  Assessment and Plan   Diagnoses and all orders for this visit:    1. Obesity affecting pregnancy, antepartum, unspecified obesity type (Primary)    2. Cough, unspecified type  Comments:  refilll abluterol inhaler UAD prn  singulair UAD qhs    3. Bronchitis  Comments:  amox bid x 7 days  Orders:  -     albuterol sulfate  (90 Base) MCG/ACT inhaler; Inhale 2 puffs Every 4 (Four) Hours As Needed for Wheezing.  Dispense: 18 g; Refill: 0    4. Wheezing    5. Class 3 severe obesity due to excess calories without serious comorbidity with body mass index (BMI) of 40.0 to 44.9 in adult  Assessment & Plan:  Patient's (Body mass index is 42.87 kg/m².) indicates that they are morbidly/severely obese (BMI > 40 or > 35 with  obesity - related health condition) with health conditions that include none . Weight is unchanged. BMI  is above average; BMI management plan is completed. We discussed portion control and increasing exercise.       6. Supervision of other normal pregnancy, antepartum  Assessment & Plan:  Keep all FU with GYN  Pt advised to phone GYN and update on medication changes       Other orders  -     montelukast (Singulair) 10 MG tablet; Take 1 tablet by mouth Every Night.  Dispense: 30 tablet; Refill: 5  -     amoxicillin (AMOXIL) 500 MG capsule; Take 1 capsule by mouth 2 (Two) Times a Day for 7 days.  Dispense: 14 capsule; Refill: 0             Follow Up   Return if symptoms worsen or fail to improve.  Patient was given instructions and counseling regarding her condition or for health maintenance advice. Please see specific information pulled into the AVS if appropriate.

## 2023-11-03 NOTE — TELEPHONE ENCOUNTER
Patient saw her PCP today for a continuing issue with bronchitis. She was prescribed an albuterol inhaler and amoxicillin and wanted to confirm those were okay to use. She was also told by her PCP to check with us and see if there was a particular daily inhaler that we recommended and if it was okay to use Singular since she is in the third trimester. Please advise.

## 2023-11-06 NOTE — ASSESSMENT & PLAN NOTE
Patient's (Body mass index is 42.87 kg/m².) indicates that they are morbidly/severely obese (BMI > 40 or > 35 with obesity - related health condition) with health conditions that include none . Weight is unchanged. BMI  is above average; BMI management plan is completed. We discussed portion control and increasing exercise.

## 2023-11-15 ENCOUNTER — ROUTINE PRENATAL (OUTPATIENT)
Dept: OBSTETRICS AND GYNECOLOGY | Facility: CLINIC | Age: 28
End: 2023-11-15
Payer: COMMERCIAL

## 2023-11-15 VITALS — BODY MASS INDEX: 41.77 KG/M2 | WEIGHT: 251 LBS | DIASTOLIC BLOOD PRESSURE: 66 MMHG | SYSTOLIC BLOOD PRESSURE: 99 MMHG

## 2023-11-15 DIAGNOSIS — J45.40 MODERATE PERSISTENT ASTHMA WITHOUT COMPLICATION: ICD-10-CM

## 2023-11-15 DIAGNOSIS — O99.210 OBESITY AFFECTING PREGNANCY, ANTEPARTUM, UNSPECIFIED OBESITY TYPE: ICD-10-CM

## 2023-11-15 DIAGNOSIS — Z34.80 SUPERVISION OF OTHER NORMAL PREGNANCY, ANTEPARTUM: Primary | ICD-10-CM

## 2023-11-15 DIAGNOSIS — O09.293 HISTORY OF MACROSOMIA IN INFANT IN PRIOR PREGNANCY, CURRENTLY PREGNANT IN THIRD TRIMESTER: ICD-10-CM

## 2023-11-15 LAB
GLUCOSE UR STRIP-MCNC: NEGATIVE MG/DL
PROT UR STRIP-MCNC: NEGATIVE MG/DL

## 2023-11-15 RX ORDER — FLUTICASONE PROPIONATE AND SALMETEROL 250; 50 UG/1; UG/1
1 POWDER RESPIRATORY (INHALATION)
Qty: 1 EACH | Refills: 1 | Status: SHIPPED | OUTPATIENT
Start: 2023-11-15

## 2023-11-15 NOTE — ASSESSMENT & PLAN NOTE
Given the patient's significant wheezing and response to albuterol, I suspect she may have some asthma.  Given she is using albuterol around the clock, I recommended starting Advair 250 per 50.  She will do 1 puff twice daily.  We will reevaluate in 2 weeks if there is no improvement and decreased need in albuterol rescue inhaler use then we will discontinue the Advair.  If she has improved and is seen less use of the albuterol rescue inhaler then we will continue the Advair.  Recommended continue the Singulair.  She will continue albuterol MDI and nebs belies her as needed.  She will complete her antibiotic course she is currently on.

## 2023-11-15 NOTE — PROGRESS NOTES
OB FOLLOW UP    CC: Scheduled OB routine FU     Prenatal care complicated by:   Patient Active Problem List   Diagnosis    Obesity without serious comorbidity    Bronchitis    Supervision of other normal pregnancy, antepartum    Obesity affecting pregnancy, antepartum    History of macrosomia in infant in prior pregnancy, currently pregnant in third trimester    Moderate persistent asthma without complication       Subjective:   Patient has: No leaking fluid, No vaginal bleeding, No contractions, Adequate FM  Complains of chronic cough.  The patient reports she been diagnosed with bronchitis but has been on several rounds of antibiotics and continues to have coughing.  She does feel better when she is on antibiotics but she is also taking Singulair and albuterol treatments.  She is having to use albuterol nebulizers and inhalers around-the-clock.  Denies any fever or chills, just has the wheezing and the chronic cough.    Objective:  Urine glucose/protein- see flow sheet      BP 99/66   Wt 114 kg (251 lb)   LMP 2023   BMI 41.77 kg/m²   See OB flow for LE edema, and cvx exam if applicable  FHT: 135 BPM   Uterine Size:  31 cm      Assessment and Plan:  Diagnoses and all orders for this visit:    1. Supervision of other normal pregnancy, antepartum (Primary)  Overview:  KEEGAN finalize:Estimated Date of Delivery: 24    Genetic testing (NIPS-Quad)/CF/AFP:  Declined     COVID-19 vaccine: Recommended  Flu vaccine: Recommended, Rx 10/18/2023  Tdap vaccine: Rx 10/18/2023  RSV vaccine: Rx 10/18/2023    Rhogam: A Positive    Anatomy US:Growth 53%, AC 50%, consistent with dates,  FHTS 148, all anatomy seen and WNL, Anterior placenta-grade 1, 3VC with normal insertion, vertex, male      Assessment & Plan:  Continue prenatal vitamins  Fetal kick counts   labor warnings    Orders:  -     POC Urinalysis Dipstick    2. Obesity affecting pregnancy, antepartum, unspecified obesity type    3. Moderate persistent  asthma without complication  Assessment & Plan:  Given the patient's significant wheezing and response to albuterol, I suspect she may have some asthma.  Given she is using albuterol around the clock, I recommended starting Advair 250 per 50.  She will do 1 puff twice daily.  We will reevaluate in 2 weeks if there is no improvement and decreased need in albuterol rescue inhaler use then we will discontinue the Advair.  If she has improved and is seen less use of the albuterol rescue inhaler then we will continue the Advair.  Recommended continue the Singulair.  She will continue albuterol MDI and nebs belies her as needed.  She will complete her antibiotic course she is currently on.      Orders:  -     Fluticasone-Salmeterol (Advair Diskus) 250-50 MCG/ACT DISKUS; Inhale 1 puff 2 (Two) Times a Day.  Dispense: 1 each; Refill: 1    4. History of macrosomia in infant in prior pregnancy, currently pregnant in third trimester  Assessment & Plan:  Check growth ultrasound    Orders:  -     US Ob 14 + Weeks Single or First Gestation; Future          29w5d  Reassuring pregnancy progress    Counseling: OB precautions, leaking, VB, beth rai vs PTL/Labor  [facility]    Questions answered    Return in about 2 weeks (around 11/29/2023) for Recheck.      Dylan Deleon MD  11/15/2023

## 2023-11-29 ENCOUNTER — ROUTINE PRENATAL (OUTPATIENT)
Dept: OBSTETRICS AND GYNECOLOGY | Facility: CLINIC | Age: 28
End: 2023-11-29
Payer: COMMERCIAL

## 2023-11-29 VITALS — SYSTOLIC BLOOD PRESSURE: 101 MMHG | DIASTOLIC BLOOD PRESSURE: 68 MMHG | BODY MASS INDEX: 42.27 KG/M2 | WEIGHT: 254 LBS

## 2023-11-29 DIAGNOSIS — O99.519 ASTHMA AFFECTING PREGNANCY, ANTEPARTUM: ICD-10-CM

## 2023-11-29 DIAGNOSIS — J45.909 ASTHMA AFFECTING PREGNANCY, ANTEPARTUM: ICD-10-CM

## 2023-11-29 DIAGNOSIS — O09.293 HISTORY OF MACROSOMIA IN INFANT IN PRIOR PREGNANCY, CURRENTLY PREGNANT IN THIRD TRIMESTER: ICD-10-CM

## 2023-11-29 DIAGNOSIS — O99.210 OBESITY AFFECTING PREGNANCY, ANTEPARTUM, UNSPECIFIED OBESITY TYPE: ICD-10-CM

## 2023-11-29 DIAGNOSIS — Z34.80 SUPERVISION OF OTHER NORMAL PREGNANCY, ANTEPARTUM: Primary | ICD-10-CM

## 2023-11-29 PROBLEM — J45.40 MODERATE PERSISTENT ASTHMA WITHOUT COMPLICATION: Status: RESOLVED | Noted: 2023-11-15 | Resolved: 2023-11-29

## 2023-11-29 PROBLEM — J40 BRONCHITIS: Status: RESOLVED | Noted: 2023-10-02 | Resolved: 2023-11-29

## 2023-11-29 LAB
GLUCOSE UR STRIP-MCNC: NEGATIVE MG/DL
PROT UR STRIP-MCNC: NEGATIVE MG/DL

## 2023-11-29 PROCEDURE — 0502F SUBSEQUENT PRENATAL CARE: CPT | Performed by: OBSTETRICS & GYNECOLOGY

## 2023-11-29 NOTE — PROGRESS NOTES
OB FOLLOW UP    CC: Scheduled OB routine FU     Prenatal care complicated by:   Patient Active Problem List   Diagnosis    Obesity without serious comorbidity    Supervision of other normal pregnancy, antepartum    Obesity affecting pregnancy, antepartum    History of macrosomia in infant in prior pregnancy, currently pregnant in third trimester    Asthma affecting pregnancy, antepartum       Subjective:   Patient has: No complaints, No leaking fluid, No vaginal bleeding, No contractions, Adequate FM  Feeling much better after the addition of Advair.  Has not required any albuterol since starting Advair inhaler.    Objective:  Urine glucose/protein- see flow sheet      /68   Wt 115 kg (254 lb)   LMP 2023   BMI 42.27 kg/m²   See OB flow for LE edema, and cvx exam if applicable  FHT: 137 BPM   Uterine Size:  31 cm      Assessment and Plan:  Diagnoses and all orders for this visit:    1. Supervision of other normal pregnancy, antepartum (Primary)  Overview:  KEEGAN finalize:Estimated Date of Delivery: 24    Genetic testing (NIPS-Quad)/CF/AFP:  Declined     COVID-19 vaccine: Recommended  Flu vaccine: Recommended, Rx 10/18/2023  Tdap vaccine: Rx 10/18/2023  RSV vaccine: Rx 10/18/2023    Rhogam: A Positive    Anatomy US:Growth 53%, AC 50%, consistent with dates,  FHTS 148, all anatomy seen and WNL, Anterior placenta-grade 1, 3VC with normal insertion, vertex, male      Assessment & Plan:  Continue prenatal vitamins  Fetal kick counts   labor warnings    Orders:  -     POC Urinalysis Dipstick    2. Obesity affecting pregnancy, antepartum, unspecified obesity type    3. History of macrosomia in infant in prior pregnancy, currently pregnant in third trimester  Assessment & Plan:  Growth ultrasound scheduled      4. Asthma affecting pregnancy, antepartum  Assessment & Plan:  Patient is much better after the addition of Advair 250/50.  She has not had any albuterol requirements and overall feels much  better.  The question remains if this is underlying asthma exacerbated by the pregnancy but may be recent bronchitis or more chronic bronchitis situation.  Since the patient is doing much better I have asked her to hold off on use of the Advair for the next couple of days.  If symptoms return I want her to return to use the Advair we will continue for the remainder of the pregnancy.  If symptoms remain at bay then we will continue to monitor and may be consider this more chronic bronchitis.              31w5d  Reassuring pregnancy progress    Counseling: OB precautions, leaking, VB, beth rai vs PTL/Labor  [facility]    Questions answered    Return in about 2 weeks (around 12/13/2023) for Recheck.      Dylan Deleon MD  11/29/2023

## 2023-11-29 NOTE — ASSESSMENT & PLAN NOTE
Patient is much better after the addition of Advair 250/50.  She has not had any albuterol requirements and overall feels much better.  The question remains if this is underlying asthma exacerbated by the pregnancy but may be recent bronchitis or more chronic bronchitis situation.  Since the patient is doing much better I have asked her to hold off on use of the Advair for the next couple of days.  If symptoms return I want her to return to use the Advair we will continue for the remainder of the pregnancy.  If symptoms remain at bay then we will continue to monitor and may be consider this more chronic bronchitis.

## 2023-12-13 ENCOUNTER — ROUTINE PRENATAL (OUTPATIENT)
Dept: OBSTETRICS AND GYNECOLOGY | Facility: CLINIC | Age: 28
End: 2023-12-13
Payer: COMMERCIAL

## 2023-12-13 VITALS — WEIGHT: 254 LBS | SYSTOLIC BLOOD PRESSURE: 112 MMHG | DIASTOLIC BLOOD PRESSURE: 77 MMHG | BODY MASS INDEX: 42.27 KG/M2

## 2023-12-13 DIAGNOSIS — O99.210 OBESITY AFFECTING PREGNANCY, ANTEPARTUM, UNSPECIFIED OBESITY TYPE: ICD-10-CM

## 2023-12-13 DIAGNOSIS — Z34.80 SUPERVISION OF OTHER NORMAL PREGNANCY, ANTEPARTUM: Primary | ICD-10-CM

## 2023-12-13 DIAGNOSIS — J45.909 ASTHMA AFFECTING PREGNANCY, ANTEPARTUM: ICD-10-CM

## 2023-12-13 DIAGNOSIS — O09.293 HISTORY OF MACROSOMIA IN INFANT IN PRIOR PREGNANCY, CURRENTLY PREGNANT IN THIRD TRIMESTER: ICD-10-CM

## 2023-12-13 DIAGNOSIS — O99.519 ASTHMA AFFECTING PREGNANCY, ANTEPARTUM: ICD-10-CM

## 2023-12-13 LAB
GLUCOSE UR STRIP-MCNC: NEGATIVE MG/DL
PROT UR STRIP-MCNC: NEGATIVE MG/DL

## 2023-12-13 NOTE — PROGRESS NOTES
OB FOLLOW UP    CC: Scheduled OB routine FU     Prenatal care complicated by:   Patient Active Problem List   Diagnosis    Obesity without serious comorbidity    Supervision of other normal pregnancy, antepartum    Obesity affecting pregnancy, antepartum    History of macrosomia in infant in prior pregnancy, currently pregnant in third trimester    Asthma affecting pregnancy, antepartum       Subjective:   Patient has: No complaints, No leaking fluid, No vaginal bleeding, No contractions, Adequate FM    Objective:  Urine glucose/protein- see flow sheet      /77   Wt 115 kg (254 lb)   LMP 2023   BMI 42.27 kg/m²   See OB flow for LE edema, and cvx exam if applicable  FHT: 130 BPM   Uterine Size:  34 cm      Assessment and Plan:  Diagnoses and all orders for this visit:    1. Supervision of other normal pregnancy, antepartum (Primary)  Overview:  KEEGAN finalize:Estimated Date of Delivery: 24    Genetic testing (NIPS-Quad)/CF/AFP:  Declined     COVID-19 vaccine: Recommended  Flu vaccine: Recommended, Rx 10/18/2023  Tdap vaccine: Rx 10/18/2023  RSV vaccine: Rx 10/18/2023    Rhogam: A Positive    Anatomy US:Growth 53%, AC 50%, consistent with dates,  FHTS 148, all anatomy seen and WNL, Anterior placenta-grade 1, 3VC with normal insertion, vertex, male      Assessment & Plan:  Continue prenatal vitamins  Fetal kick counts   labor warnings    Orders:  -     POC Urinalysis Dipstick    2. History of macrosomia in infant in prior pregnancy, currently pregnant in third trimester  Assessment & Plan:  Reviewed today's ultrasound findings.  Baby is greater than 90th percentile, abdominal circumference greater than 99th percentile.  Recommend repeating in another growth ultrasound prior to delivery.    Orders:  -     US Ob 14 + Weeks Single or First Gestation; Future    3. Asthma affecting pregnancy, antepartum  Assessment & Plan:  Symptoms are stable.  Continue Advair 250/50 1 puff twice daily.  Continue  Singulair 10 mg daily.        4. Obesity affecting pregnancy, antepartum, unspecified obesity type  Assessment & Plan:  Discussed exercise, nutrition and appropriate weight gain in pregnancy            33w5d  Reassuring pregnancy progress    Counseling: OB precautions, leaking, VB, beth rai vs PTL/Labor  [facility]    Questions answered    Return in about 2 weeks (around 12/27/2023) for Recheck.      Dylan Deleon MD  12/13/2023

## 2023-12-14 NOTE — ASSESSMENT & PLAN NOTE
Symptoms are stable.  Continue Advair 250/50 1 puff twice daily.  Continue Singulair 10 mg daily.

## 2023-12-14 NOTE — ASSESSMENT & PLAN NOTE
Reviewed today's ultrasound findings.  Baby is greater than 90th percentile, abdominal circumference greater than 99th percentile.  Recommend repeating in another growth ultrasound prior to delivery.

## 2023-12-26 NOTE — PROGRESS NOTES
OB FOLLOW UP    CC: Scheduled OB routine FU     Prenatal care complicated by:   Patient Active Problem List   Diagnosis    Obesity without serious comorbidity    Supervision of other normal pregnancy, antepartum    Obesity affecting pregnancy, antepartum    History of macrosomia in infant in prior pregnancy, currently pregnant in third trimester    Asthma affecting pregnancy, antepartum       Subjective:   Patient has: No complaints, No leaking fluid, No vaginal bleeding, Adequate FM      Objective:  Urine glucose/protein- see flow sheet      BP 97/65   Wt 117 kg (259 lb)   LMP 03/22/2023   BMI 43.10 kg/m²   See OB flow for LE edema, and cvx exam if applicable  FHT: 136 BPM   Uterine Size:  37 cm    Assessment and Plan:  Diagnoses and all orders for this visit:    1. Obesity affecting pregnancy, antepartum, unspecified obesity type (Primary)  Assessment & Plan:  Discussed exercise, nutrition and appropriate weight gain in pregnancy.  Start NSTs next week    Orders:  -     Fetal Nonstress Test; Standing    2. Supervision of other normal pregnancy, antepartum  Overview:  KEEGAN finalize:Estimated Date of Delivery: 1/26/24    Genetic testing (NIPS-Quad)/CF/AFP:  Declined     COVID-19 vaccine: Recommended  Flu vaccine: Recommended, Rx 10/18/2023  Tdap vaccine: Rx 10/18/2023  RSV vaccine: Rx 10/18/2023    Rhogam: A Positive    Anatomy US:Growth 53%, AC 50%, consistent with dates,  FHTS 148, all anatomy seen and WNL, Anterior placenta-grade 1, 3VC with normal insertion, vertex, male      Assessment & Plan:  GBS collected  Continue prenatal vitamins  Fetal kick counts  Labor instructions    Orders:  -     POC Urinalysis Dipstick  -     Group B Strep (Molecular) - Swab, Vaginal/Rectum    3. History of macrosomia in infant in prior pregnancy, currently pregnant in third trimester    4. Asthma affecting pregnancy, antepartum  Assessment & Plan:  Doing well.  Continue Advair 250/50 1 puff twice a day.  The patient is only had  to use her rescue inhaler 1 time.  Continue Singulair 10 mg daily            35w4d  Reassuring pregnancy progress    Counseling: OB precautions, leaking, VB, beth rai vs PTL/Labor  [facility]    Questions answered    Return in about 1 week (around 1/3/2024) for Recheck.      Dylan Deleon MD  12/27/2023

## 2023-12-27 ENCOUNTER — ROUTINE PRENATAL (OUTPATIENT)
Dept: OBSTETRICS AND GYNECOLOGY | Facility: CLINIC | Age: 28
End: 2023-12-27
Payer: COMMERCIAL

## 2023-12-27 VITALS — DIASTOLIC BLOOD PRESSURE: 65 MMHG | WEIGHT: 259 LBS | BODY MASS INDEX: 43.1 KG/M2 | SYSTOLIC BLOOD PRESSURE: 97 MMHG

## 2023-12-27 DIAGNOSIS — J45.909 ASTHMA AFFECTING PREGNANCY, ANTEPARTUM: ICD-10-CM

## 2023-12-27 DIAGNOSIS — O09.293 HISTORY OF MACROSOMIA IN INFANT IN PRIOR PREGNANCY, CURRENTLY PREGNANT IN THIRD TRIMESTER: ICD-10-CM

## 2023-12-27 DIAGNOSIS — O99.210 OBESITY AFFECTING PREGNANCY, ANTEPARTUM, UNSPECIFIED OBESITY TYPE: Primary | ICD-10-CM

## 2023-12-27 DIAGNOSIS — O99.519 ASTHMA AFFECTING PREGNANCY, ANTEPARTUM: ICD-10-CM

## 2023-12-27 DIAGNOSIS — Z34.80 SUPERVISION OF OTHER NORMAL PREGNANCY, ANTEPARTUM: ICD-10-CM

## 2023-12-27 LAB
GLUCOSE UR STRIP-MCNC: NEGATIVE MG/DL
PROT UR STRIP-MCNC: NEGATIVE MG/DL

## 2023-12-27 PROCEDURE — 87653 STREP B DNA AMP PROBE: CPT | Performed by: OBSTETRICS & GYNECOLOGY

## 2023-12-27 PROCEDURE — 0502F SUBSEQUENT PRENATAL CARE: CPT | Performed by: OBSTETRICS & GYNECOLOGY

## 2023-12-27 NOTE — ASSESSMENT & PLAN NOTE
Doing well.  Continue Advair 250/50 1 puff twice a day.  The patient is only had to use her rescue inhaler 1 time.  Continue Singulair 10 mg daily

## 2023-12-28 LAB — GROUP B STREP, DNA: POSITIVE

## 2024-01-03 ENCOUNTER — ROUTINE PRENATAL (OUTPATIENT)
Dept: OBSTETRICS AND GYNECOLOGY | Facility: CLINIC | Age: 29
End: 2024-01-03
Payer: COMMERCIAL

## 2024-01-03 ENCOUNTER — HOSPITAL ENCOUNTER (OUTPATIENT)
Dept: LABOR AND DELIVERY | Facility: HOSPITAL | Age: 29
Discharge: HOME | End: 2024-01-03
Payer: COMMERCIAL

## 2024-01-03 ENCOUNTER — HOSPITAL ENCOUNTER (OUTPATIENT)
Facility: HOSPITAL | Age: 29
Discharge: HOME | End: 2024-01-03
Attending: OBSTETRICS & GYNECOLOGY | Admitting: OBSTETRICS & GYNECOLOGY
Payer: COMMERCIAL

## 2024-01-03 VITALS — SYSTOLIC BLOOD PRESSURE: 110 MMHG | BODY MASS INDEX: 42.27 KG/M2 | WEIGHT: 254 LBS | DIASTOLIC BLOOD PRESSURE: 74 MMHG

## 2024-01-03 VITALS
OXYGEN SATURATION: 97 % | DIASTOLIC BLOOD PRESSURE: 70 MMHG | SYSTOLIC BLOOD PRESSURE: 119 MMHG | HEART RATE: 106 BPM | BODY MASS INDEX: 39.78 KG/M2 | RESPIRATION RATE: 16 BRPM | HEIGHT: 67 IN

## 2024-01-03 DIAGNOSIS — J45.909 ASTHMA AFFECTING PREGNANCY, ANTEPARTUM: ICD-10-CM

## 2024-01-03 DIAGNOSIS — Z34.80 SUPERVISION OF OTHER NORMAL PREGNANCY, ANTEPARTUM: Primary | ICD-10-CM

## 2024-01-03 DIAGNOSIS — O99.210 OBESITY AFFECTING PREGNANCY, ANTEPARTUM, UNSPECIFIED OBESITY TYPE: ICD-10-CM

## 2024-01-03 DIAGNOSIS — O99.519 ASTHMA AFFECTING PREGNANCY, ANTEPARTUM: ICD-10-CM

## 2024-01-03 DIAGNOSIS — O09.293 HISTORY OF MACROSOMIA IN INFANT IN PRIOR PREGNANCY, CURRENTLY PREGNANT IN THIRD TRIMESTER: ICD-10-CM

## 2024-01-03 LAB
GLUCOSE UR STRIP-MCNC: NEGATIVE MG/DL
PROT UR STRIP-MCNC: NEGATIVE MG/DL

## 2024-01-03 PROCEDURE — 59025 FETAL NON-STRESS TEST: CPT | Performed by: OBSTETRICS & GYNECOLOGY

## 2024-01-03 PROCEDURE — 0502F SUBSEQUENT PRENATAL CARE: CPT | Performed by: OBSTETRICS & GYNECOLOGY

## 2024-01-03 PROCEDURE — 59025 FETAL NON-STRESS TEST: CPT

## 2024-01-03 NOTE — NON STRESS TEST
"Obstetrical Non-stress Test Interpretation     Name:  Solange Guzmán  MRN: [id number]    28 y.o. female  at 36w5d    Indication: obesity      Fetal Assessment  Fetal Movement: active  Fetal HR Assessment Method: external  Fetal HR (beats/min): 130  Fetal HR Baseline: normal range  Fetal HR Variability: moderate (amplitude range 6 to 25 bpm)  Fetal HR Accelerations: greater than/equal to 15 bpm, lasting at least 15 seconds  Fetal HR Decelerations: absent  Sinusoidal Pattern Present: absent    /70 (BP Location: Right arm, Patient Position: Sitting)   Pulse 106   Resp 16   Ht 170.2 cm (67\")   LMP 2023   SpO2 97%   BMI 39.78 kg/m²     Reason for test:  (obesity)  Date of Test: 1/3/2024  Time frame of test: 4771-7722  RN NST Interpretation: Reactive      Maine Dow RN  1/3/2024  12:19 EST  "

## 2024-01-03 NOTE — PROGRESS NOTES
OB FOLLOW UP    CC: Scheduled OB routine FU     Prenatal care complicated by:   Patient Active Problem List   Diagnosis    Obesity without serious comorbidity    Supervision of other normal pregnancy, antepartum    Obesity affecting pregnancy, antepartum    History of macrosomia in infant in prior pregnancy, currently pregnant in third trimester    Asthma affecting pregnancy, antepartum       Subjective:   Patient has: No complaints, No leaking fluid, No vaginal bleeding, Adequate FM  Reports occasional contractions    Objective:  Urine glucose/protein- see flow sheet      /74   Wt 115 kg (254 lb)   LMP 03/22/2023   BMI 42.27 kg/m²   See OB flow for LE edema, and cvx exam if applicable  FHT: 136 BPM   Uterine Size:  38 cm      Assessment and Plan:  Diagnoses and all orders for this visit:    1. Supervision of other normal pregnancy, antepartum (Primary)  Overview:  KEEGAN finalize:Estimated Date of Delivery: 1/26/24    Genetic testing (NIPS-Quad)/CF/AFP:  Declined     COVID-19 vaccine: Recommended  Flu vaccine: Recommended, Rx 10/18/2023  Tdap vaccine: Rx 10/18/2023  RSV vaccine: Rx 10/18/2023    Rhogam: A Positive    Anatomy US:Growth 53%, AC 50%, consistent with dates,  FHTS 148, all anatomy seen and WNL, Anterior placenta-grade 1, 3VC with normal insertion, vertex, male      Assessment & Plan:  GBS was positive  Continue prenatal vitamins  Fetal kick counts  Labor instructions    Orders:  -     POC Urinalysis Dipstick    2. Obesity affecting pregnancy, antepartum, unspecified obesity type  Assessment & Plan:  Continue NSTs      3. History of macrosomia in infant in prior pregnancy, currently pregnant in third trimester  Assessment & Plan:  Growth ultrasound pending      4. Asthma affecting pregnancy, antepartum  Assessment & Plan:  Symptoms remain stable.  She has not required her rescue inhaler at all.  Continue Advair 250 per 51 puff twice daily.  Continue Singulair 10 mg nightly.              36w5d   Reassuring pregnancy progress    Counseling: OB precautions, leaking, VB, beth rai vs PTL/Labor  [facility]    Questions answered    Return in about 1 week (around 1/10/2024) for Recheck.      Dylan Deleon MD  01/03/2024

## 2024-01-04 NOTE — ASSESSMENT & PLAN NOTE
Symptoms remain stable.  She has not required her rescue inhaler at all.  Continue Advair 250 per 51 puff twice daily.  Continue Singulair 10 mg nightly.

## 2024-01-05 ENCOUNTER — HOSPITAL ENCOUNTER (OUTPATIENT)
Facility: HOSPITAL | Age: 29
Discharge: HOME | End: 2024-01-05
Attending: OBSTETRICS & GYNECOLOGY | Admitting: OBSTETRICS & GYNECOLOGY
Payer: COMMERCIAL

## 2024-01-05 VITALS — HEART RATE: 95 BPM | OXYGEN SATURATION: 100 % | SYSTOLIC BLOOD PRESSURE: 103 MMHG | DIASTOLIC BLOOD PRESSURE: 40 MMHG

## 2024-01-05 VITALS
DIASTOLIC BLOOD PRESSURE: 74 MMHG | SYSTOLIC BLOOD PRESSURE: 114 MMHG | RESPIRATION RATE: 16 BRPM | BODY MASS INDEX: 39.87 KG/M2 | HEART RATE: 103 BPM | TEMPERATURE: 97.9 F | HEIGHT: 67 IN | WEIGHT: 254 LBS

## 2024-01-05 LAB
BILIRUB BLD-MCNC: NEGATIVE MG/DL
CLARITY, POC: CLEAR
COLOR UR: YELLOW
GLUCOSE UR STRIP-MCNC: NEGATIVE MG/DL
KETONES UR QL: NEGATIVE
LEUKOCYTE EST, POC: NEGATIVE
NITRITE UR-MCNC: NEGATIVE MG/ML
PH UR: 7 [PH] (ref 5–8)
PROT UR STRIP-MCNC: NEGATIVE MG/DL
RBC # UR STRIP: NEGATIVE /UL
SP GR UR: 1.01 (ref 1–1.03)
UROBILINOGEN UR QL: NORMAL

## 2024-01-05 PROCEDURE — G0463 HOSPITAL OUTPT CLINIC VISIT: HCPCS

## 2024-01-05 PROCEDURE — 59025 FETAL NON-STRESS TEST: CPT

## 2024-01-05 PROCEDURE — 81002 URINALYSIS NONAUTO W/O SCOPE: CPT | Performed by: OBSTETRICS & GYNECOLOGY

## 2024-01-05 NOTE — NURSING NOTE
At patient's bedside, labor precautions and discharge instructions discussed with patient. All questions answered. Patient verbalized understanding of all and left unit via ambulatory in stable condition accompanied by significant other.

## 2024-01-05 NOTE — H&P
ANUJA Lawson  Obstetric History and Physical    Chief Complaint   Patient presents with    Contractions       Subjective     HPI:    Patient is a 28 y.o. female  currently at 37w0d, who presents to OB ED with/for contractions since about 3am. They have gradually increased in frequency and pain. She denies leaking fluid. She reports good fetal movement.    She has been seeing [facility] [place] for her prenatal care.  The pregnancy has been complicated by:    Patient Active Problem List   Diagnosis    Supervision of other normal pregnancy, antepartum    Obesity affecting pregnancy, antepartum    History of macrosomia in infant in prior pregnancy, currently pregnant in third trimester    Asthma affecting pregnancy, antepartum       The following portions of the patients history were reviewed and updated as appropriate:   current medications, allergies, past medical history, past surgical history, past family history, past social history and current problem list.     Prenatal Information:  Prenatal Results       Initial Prenatal Labs       Test Value Reference Range Date Time    Hemoglobin  13.5 g/dL 12.0 - 15.9 23 1035    Hematocrit  39.1 % 34.0 - 46.6 23 1035    Platelets  244 10*3/mm3 140 - 450 23 1035    Rubella IgG  5.47 index Immune >0.99 23 1035    Hepatitis B SAg  Non-Reactive  Non-Reactive 23 1035    Hepatitis C Ab  Non-Reactive  Non-Reactive 23 1035    RPR ^ Negative   21     T. Pallidum Ab   Non-Reactive  Non-Reactive 23 1035    ABO  A   23 1035    Rh  Positive   23 1035    Antibody Screen  Negative   23 1035    HIV  Non-Reactive  Non-Reactive 23 1035    Urine Culture  50,000 CFU/mL Normal Urogenital Jessica   23 1020    Gonorrhea  Negative  Negative 23 1020    Chlamydia  Negative  Negative 23 1020                2nd and 3rd Trimester       Test Value Reference Range Date Time    Hemoglobin (repeated)  11.8 g/dL 12.0 - 15.9  10/18/23 1153       11.5 g/dL 12.0 - 15.9 10/02/23 2105    Hematocrit (repeated)  35.1 % 34.0 - 46.6 10/18/23 1153       33.5 % 34.0 - 46.6 10/02/23 2105    Platelets   184 10*3/mm3 140 - 450 10/18/23 1153       226 10*3/mm3 140 - 450 10/02/23 2105       244 10*3/mm3 140 - 450 23 1035    GCT  176 mg/dL 74 - 180 23 0904       145 mg/dL 65 - 139 10/18/23 1153    GTT 3 Hr  62 mg/dL 74 - 140 23 1059    Group B Strep  Positive  Negative 23 1125               External Prenatal Results       Pregnancy Outside Results - Transcribed From Office Records - See Scanned Records For Details       Test Value Date Time    ABO  A  23 1035    Rh  Positive  23 1035    Antibody Screen  Negative  23 1035    Varicella IgG       Rubella  5.47 index 23 1035    Hgb  11.8 g/dL 10/18/23 1153       11.5 g/dL 10/02/23 2105       13.5 g/dL 23 1035    Hct  35.1 % 10/18/23 1153       33.5 % 10/02/23 2105       39.1 % 23 1035    Glucose Fasting GTT       Glucose Tolerance Test 1 hour       Glucose Tolerance Test 3 hour  62 mg/dL 23 1059    Gonorrhea (discrete)  Negative  23 1020    Chlamydia (discrete)  Negative  23 1020    RPR ^ Negative  21     VDRL ^ Nonreactive  20     Syphilis Antibody       HBsAg  Non-Reactive  23 1035    Herpes Simplex Virus PCR       Herpes Simplex VIrus Culture       HIV  Non-Reactive  23 1035    Hep C RNA Quant PCR       Hep C Antibody  Non-Reactive  23 1035    AFP       Group B Strep  Positive  23 1125                Past OB History:     OB History    Para Term  AB Living   3 2 2 0 0 2   SAB IAB Ectopic Molar Multiple Live Births   0 0 0 0 0 2      # Outcome Date GA Lbr Jt/2nd Weight Sex Delivery Anes PTL Lv   3 Current            2 Term 21 39w1d 06:47 / 00:57 4190 g (9 lb 3.8 oz) F Vag-Spont EPI N HADLEY      Name: ALETHEA TRINIDAD      Apgar1: 8  Apgar5: 9   1 Term 20 37w0d  3600  g (7 lb 15 oz) F Vag-Spont  N HADLEY       Past Medical History: History reviewed. No pertinent past medical history.   Past Surgical History Past Surgical History:   Procedure Laterality Date    ULNAR NERVE REPAIR  02/12/2016    ULNAR NERVE TRANSPOSITION Right 12/14/2015    WISDOM TOOTH EXTRACTION  01/2015      Family History: Family History   Problem Relation Age of Onset    No Known Problems Father     No Known Problems Mother     Ovarian cancer Neg Hx     Uterine cancer Neg Hx     Breast cancer Neg Hx     Colon cancer Neg Hx     Prostate cancer Neg Hx     Clotting disorder Neg Hx     Pulmonary embolism Neg Hx     Deep vein thrombosis Neg Hx       Social History:  reports that she has never smoked. She has never used smokeless tobacco.   reports that she does not currently use alcohol.   reports no history of drug use.        General ROS: Pertinent items are noted in HPI  Home Medications:  Fluticasone-Salmeterol, Wheat Dextrin, montelukast, and prenatal vitamin 27-0.8    Allergies:  No Known Allergies    Objective       Vital Signs Range for the last 24 hours  Temperature: Temp:  [97.9 °F (36.6 °C)] 97.9 °F (36.6 °C)   Temp Source: Temp src: Oral   BP: BP: (114)/(74) 114/74   Pulse: Heart Rate:  [103] 103   Respirations:     SPO2:       Physical Examination:   General appearance - alert, well appearing, and in no distress  Mental status - alert, oriented to person, place, and time  Chest - clear to auscultation  Heart - normal rate, regular rhythm,  Abdomen - soft, nontender, nondistended  Pelvic - external genitalia normal; cervix 3/50/-3  Back exam - full range of motion, no tenderness or pain on motion  Neurological - alert, oriented, normal speech  Extremities - Edema none  Skin - normal coloration and turgor, no suspicious skin lesions noted    Presentation: cephalic   Cervix: Method: sterile exam per RN   Dilation: Cervical Dilation (cm): 3   Effacement: Cervical Effacement: 50%   Station:         Fetal Heart  Rate Assessment :      Beats/min: Fetal HR (beats/min): 130   Baseline: Fetal HR Baseline: normal range   Variability: Fetal HR Variability: moderate (amplitude range 6 to 25 bpm)   Accels: Fetal HR Accelerations: episodic, greater than/equal to 15 bpm, lasting at least 15 seconds   Decels: Fetal HR Decelerations: absent   Tracing Category:       Uterine Assessment   Method: Method: palpation, external tocotransducer   Frequency (min): Contraction Frequency (Minutes): 3-9 min.   Ctx Count in 10 min:     Duration:     Intensity: Contraction Intensity: mild by palpation   [place] Units:       GBS is positive      Assessment & Plan     Assessment:  -  Intrauterine pregnancy at 37w0d gestation who presents for: contractions  -  GBS status:   Group B Strep, DNA   Date Value Ref Range Status   12/27/2023 Positive (A) Negative Final       Plan:  - False labor. Reactive fetal heart rate tracing. No change after 2 hours. Ok for discharge to home. Return with worsening/more painful contractions, leaking fluid, vaginal bleeding, decreased fetal movement.  - Plan of care has been reviewed with patient and patient agrees.   - Risks, benefits of treatment plan have been discussed.  - All questions have been answered.        Electronically signed by Kathi Tate MD, 01/05/24, 10:12 AM EST.

## 2024-01-05 NOTE — NON STRESS TEST
"Obstetrical Non-stress Test Interpretation     Name:  Solange Guzmán  MRN: [id number]    28 y.o. female  at 37w0d    Indication: contractions      Fetal Movement: active  Fetal HR Assessment Method: external  Fetal HR (beats/min): 140  Fetal HR Baseline: normal range  Fetal HR Variability: moderate (amplitude range 6 to 25 bpm)  Fetal HR Accelerations: episodic, greater than/equal to 15 bpm, lasting at least 15 seconds  Fetal HR Decelerations: absent  Sinusoidal Pattern Present: absent    /74 (BP Location: Right arm, Patient Position: Lying)   Pulse 103   Temp 97.9 °F (36.6 °C) (Oral)   Ht 170.2 cm (67\")   Wt 115 kg (254 lb)   LMP 2023   BMI 39.78 kg/m²     Reason for test: OB Triage (contractions)  Date of Test: 2024  Time frame of test: 1087-0221  RN NST Interpretation: Reactive      Aga Avila RN  2024  10:33 EST  "

## 2024-01-06 NOTE — NURSING NOTE
At patient's bedside, labor precautions and discharge instruction explained to patient. Patient verbalized understanding. Patient left unit in stable condition accompanied by significant other.

## 2024-01-06 NOTE — H&P
ANUJA Lawson  Obstetric History and Physical    Chief Complaint   Patient presents with    Contractions       Subjective     HPI:    Patient is a 28 y.o. female  currently at 37w0d, who presents to OB ED with/for contractions. She was seen earlier today for same complaint. States they are a minute closer in frequency and more intense. No leaking or bloody show. Nothing else has changed.       Past OB History:     OB History    Para Term  AB Living   3 2 2 0 0 2   SAB IAB Ectopic Molar Multiple Live Births   0 0 0 0 0 2      # Outcome Date GA Lbr Jt/2nd Weight Sex Delivery Anes PTL Lv   3 Current            2 Term 21 39w1d 06:47 / 00:57 4190 g (9 lb 3.8 oz) F Vag-Spont EPI N HADLEY      Name: ALETHEA TRINIDAD      Apgar1: 8  Apgar5: 9   1 Term 20 37w0d  3600 g (7 lb 15 oz) F Vag-Spont  N HADLEY       Past Medical History: No past medical history on file.   Past Surgical History Past Surgical History:   Procedure Laterality Date    ULNAR NERVE REPAIR  2016    ULNAR NERVE TRANSPOSITION Right 2015    WISDOM TOOTH EXTRACTION  2015      Family History: Family History   Problem Relation Age of Onset    No Known Problems Father     No Known Problems Mother     Ovarian cancer Neg Hx     Uterine cancer Neg Hx     Breast cancer Neg Hx     Colon cancer Neg Hx     Prostate cancer Neg Hx     Clotting disorder Neg Hx     Pulmonary embolism Neg Hx     Deep vein thrombosis Neg Hx       Social History:  reports that she has never smoked. She has never used smokeless tobacco.   reports that she does not currently use alcohol.   reports no history of drug use.        General ROS: Pertinent items are noted in HPI  Home Medications:  Fluticasone-Salmeterol, Wheat Dextrin, montelukast, and prenatal vitamin 27-0.8    Allergies:  No Known Allergies    Objective       Vital Signs Range for the last 24 hours  Temperature: Temp:  [97.9 °F (36.6 °C)] 97.9 °F (36.6 °C)   Temp Source: Temp src: Oral    BP: BP: (103-114)/(40-74) 103/40   Pulse: Heart Rate:  [] 95   Respirations: Resp:  [16] 16   SPO2: SpO2:  [100 %] 100 %     Physical Examination:   General appearance - alert, well appearing, and in no distress  Mental status - alert, oriented to person, place, and time  Chest - normal effort  Heart - normal rate  Pelvic - 3/50 per RN    Presentation:    Cervix: Method: sterile exam per RN   Dilation: Cervical Dilation (cm): 3   Effacement: Cervical Effacement: 50%   Station:         Fetal Heart Rate Assessment :      Beats/min: Fetal HR (beats/min): 120   Baseline: Fetal HR Baseline: normal range   Variability: Fetal HR Variability: moderate (amplitude range 6 to 25 bpm)   Accels: Fetal HR Accelerations: greater than/equal to 15 bpm, lasting at least 15 seconds   Decels: Fetal HR Decelerations: absent   Tracing Category:        Assessment & Plan     Assessment:  -  Intrauterine pregnancy at 37w0d gestation who presents for: contractions  -  GBS status:   Group B Strep, DNA   Date Value Ref Range Status   12/27/2023 Positive (A) Negative Final       Plan:  - Patient allowed to ambulate and rechecked after 1.5 hours. Exam =   - Plan of care has been reviewed with patient and patient agrees.   - Risks, benefits of treatment plan have been discussed.  - All questions have been answered.        Electronically signed by Kathi Tate MD, 01/05/24, 7:13 PM EST.

## 2024-01-09 ENCOUNTER — ROUTINE PRENATAL (OUTPATIENT)
Dept: OBSTETRICS AND GYNECOLOGY | Facility: CLINIC | Age: 29
End: 2024-01-09
Payer: COMMERCIAL

## 2024-01-09 VITALS — DIASTOLIC BLOOD PRESSURE: 67 MMHG | SYSTOLIC BLOOD PRESSURE: 116 MMHG | BODY MASS INDEX: 39.94 KG/M2 | WEIGHT: 255 LBS

## 2024-01-09 DIAGNOSIS — O09.293 HISTORY OF MACROSOMIA IN INFANT IN PRIOR PREGNANCY, CURRENTLY PREGNANT IN THIRD TRIMESTER: ICD-10-CM

## 2024-01-09 DIAGNOSIS — J45.909 ASTHMA AFFECTING PREGNANCY, ANTEPARTUM: ICD-10-CM

## 2024-01-09 DIAGNOSIS — O99.519 ASTHMA AFFECTING PREGNANCY, ANTEPARTUM: ICD-10-CM

## 2024-01-09 DIAGNOSIS — Z34.80 SUPERVISION OF OTHER NORMAL PREGNANCY, ANTEPARTUM: Primary | ICD-10-CM

## 2024-01-09 DIAGNOSIS — O99.210 OBESITY AFFECTING PREGNANCY, ANTEPARTUM, UNSPECIFIED OBESITY TYPE: ICD-10-CM

## 2024-01-09 LAB
GLUCOSE UR STRIP-MCNC: NEGATIVE MG/DL
PROT UR STRIP-MCNC: NEGATIVE MG/DL

## 2024-01-09 PROCEDURE — 0502F SUBSEQUENT PRENATAL CARE: CPT | Performed by: OBSTETRICS & GYNECOLOGY

## 2024-01-09 NOTE — PROGRESS NOTES
OB FOLLOW UP    CC: Scheduled OB routine FU     Prenatal care complicated by:   Patient Active Problem List   Diagnosis    Obesity without serious comorbidity    Supervision of other normal pregnancy, antepartum    Obesity affecting pregnancy, antepartum    History of macrosomia in infant in prior pregnancy, currently pregnant in third trimester    Asthma affecting pregnancy, antepartum       Subjective:   Patient has: No complaints, No leaking fluid, No vaginal bleeding      Objective:  Urine glucose/protein- see flow sheet      /67   Wt 116 kg (255 lb)   LMP 2023   BMI 39.94 kg/m²   See OB flow for LE edema, and cvx exam if applicable  FHT: 132 BPM   Uterine Size:  41 cm    Assessment and Plan:  Diagnoses and all orders for this visit:    1. Supervision of other normal pregnancy, antepartum (Primary)  Overview:  KEEGAN finalize:Estimated Date of Delivery: 24    Genetic testing (NIPS-Quad)/CF/AFP:  Declined     COVID-19 vaccine: Recommended  Flu vaccine: Recommended, Rx 10/18/2023  Tdap vaccine: Rx 10/18/2023  RSV vaccine: Rx 10/18/2023    Rhogam: A Positive    Anatomy US:Growth 53%, AC 50%, consistent with dates,  FHTS 148, all anatomy seen and WNL, Anterior placenta-grade 1, 3VC with normal insertion, vertex, male      Assessment & Plan:  Continue prenatal vitamins  Fetal kick counts   labor warnings    Orders:  -     POC Urinalysis Dipstick    2. Obesity affecting pregnancy, antepartum, unspecified obesity type  Assessment & Plan:  Continue NSTs      3. History of macrosomia in infant in prior pregnancy, currently pregnant in third trimester  Overview:  Growth ultrasound 2024: 4072 g, 9 pounds 0 ounces.  99th percentile for gestational age.  The AC is at the 99th percentile.    Assessment & Plan:  The patient desires attempted vaginal birth.  Recommended delivery around 39 weeks if we are to have vaginal birth.  Her pelvis is proven to 9 pounds 4 ounces without shoulder dystocia.  We  did discuss due to the baby's large size and large abdominal circumference there are increased risks of shoulder dystocia which could lead to permanent fetal injury.  We did discuss  delivery as another option to vaginal birth.  The patient desires trial of labor.      4. Asthma affecting pregnancy, antepartum  Assessment & Plan:  Continue CVA.  Continue Advair 250 per 50 twice daily.  Continue albuterol MDI..              37w4d  Reassuring pregnancy progress    Counseling: OB precautions, leaking, VB, beth rai vs PTL/Labor  [facility]    Questions answered    No follow-ups on file.      Dylan Deleon MD  2024

## 2024-01-09 NOTE — ASSESSMENT & PLAN NOTE
The patient desires attempted vaginal birth.  Recommended delivery around 39 weeks if we are to have vaginal birth.  Her pelvis is proven to 9 pounds 4 ounces without shoulder dystocia.  We did discuss due to the baby's large size and large abdominal circumference there are increased risks of shoulder dystocia which could lead to permanent fetal injury.  We did discuss  delivery as another option to vaginal birth.  The patient desires trial of labor.

## 2024-01-10 ENCOUNTER — HOSPITAL ENCOUNTER (OUTPATIENT)
Dept: LABOR AND DELIVERY | Facility: HOSPITAL | Age: 29
Discharge: HOME | End: 2024-01-10
Payer: COMMERCIAL

## 2024-01-10 ENCOUNTER — HOSPITAL ENCOUNTER (OUTPATIENT)
Facility: HOSPITAL | Age: 29
Discharge: HOME | End: 2024-01-10
Attending: STUDENT IN AN ORGANIZED HEALTH CARE EDUCATION/TRAINING PROGRAM | Admitting: STUDENT IN AN ORGANIZED HEALTH CARE EDUCATION/TRAINING PROGRAM
Payer: COMMERCIAL

## 2024-01-10 VITALS
OXYGEN SATURATION: 97 % | DIASTOLIC BLOOD PRESSURE: 63 MMHG | SYSTOLIC BLOOD PRESSURE: 128 MMHG | HEART RATE: 99 BPM | RESPIRATION RATE: 18 BRPM

## 2024-01-10 PROCEDURE — 59025 FETAL NON-STRESS TEST: CPT

## 2024-01-10 PROCEDURE — 59025 FETAL NON-STRESS TEST: CPT | Performed by: STUDENT IN AN ORGANIZED HEALTH CARE EDUCATION/TRAINING PROGRAM

## 2024-01-10 NOTE — NON STRESS TEST
Obstetrical Non-stress Test Interpretation     Name:  Solange Guzmán  MRN: [id number]    28 y.o. female  at 37w5d    Indication: obesity       Fetal Assessment  Fetal Movement: active  Fetal HR Assessment Method: external  Fetal HR (beats/min): 130  Fetal HR Baseline: normal range  Fetal HR Variability: moderate (amplitude range 6 to 25 bpm)  Fetal HR Accelerations: greater than/equal to 15 bpm, lasting at least 15 seconds  Fetal HR Decelerations: absent  Sinusoidal Pattern Present: absent    /63 (BP Location: Right arm)   Pulse 99   Resp 18   LMP 2023   SpO2 97%     Reason for test:  (obesity)  Date of Test: 1/10/2024  Time frame of test: 0684-7168  RN NST Interpretation: Reactive      Halima Elliott RN  1/10/2024  11:33 EST

## 2024-01-17 ENCOUNTER — HOSPITAL ENCOUNTER (OUTPATIENT)
Facility: HOSPITAL | Age: 29
Discharge: HOME | End: 2024-01-17
Attending: OBSTETRICS & GYNECOLOGY | Admitting: OBSTETRICS & GYNECOLOGY
Payer: COMMERCIAL

## 2024-01-17 ENCOUNTER — ROUTINE PRENATAL (OUTPATIENT)
Dept: OBSTETRICS AND GYNECOLOGY | Facility: CLINIC | Age: 29
End: 2024-01-17
Payer: COMMERCIAL

## 2024-01-17 ENCOUNTER — HOSPITAL ENCOUNTER (INPATIENT)
Facility: HOSPITAL | Age: 29
LOS: 2 days | Discharge: HOME | End: 2024-01-19
Attending: OBSTETRICS & GYNECOLOGY | Admitting: OBSTETRICS & GYNECOLOGY
Payer: COMMERCIAL

## 2024-01-17 ENCOUNTER — HOSPITAL ENCOUNTER (OUTPATIENT)
Dept: LABOR AND DELIVERY | Facility: HOSPITAL | Age: 29
Discharge: HOME | End: 2024-01-17
Payer: COMMERCIAL

## 2024-01-17 ENCOUNTER — ANESTHESIA EVENT (OUTPATIENT)
Dept: LABOR AND DELIVERY | Facility: HOSPITAL | Age: 29
End: 2024-01-17
Payer: COMMERCIAL

## 2024-01-17 ENCOUNTER — ANESTHESIA (OUTPATIENT)
Dept: LABOR AND DELIVERY | Facility: HOSPITAL | Age: 29
End: 2024-01-17
Payer: COMMERCIAL

## 2024-01-17 VITALS
RESPIRATION RATE: 18 BRPM | DIASTOLIC BLOOD PRESSURE: 72 MMHG | OXYGEN SATURATION: 100 % | SYSTOLIC BLOOD PRESSURE: 99 MMHG | HEART RATE: 95 BPM

## 2024-01-17 VITALS — DIASTOLIC BLOOD PRESSURE: 73 MMHG | WEIGHT: 256 LBS | BODY MASS INDEX: 40.1 KG/M2 | SYSTOLIC BLOOD PRESSURE: 105 MMHG

## 2024-01-17 DIAGNOSIS — O99.210 OBESITY AFFECTING PREGNANCY, ANTEPARTUM, UNSPECIFIED OBESITY TYPE: ICD-10-CM

## 2024-01-17 DIAGNOSIS — O09.293 HISTORY OF MACROSOMIA IN INFANT IN PRIOR PREGNANCY, CURRENTLY PREGNANT IN THIRD TRIMESTER: ICD-10-CM

## 2024-01-17 DIAGNOSIS — Z34.80 SUPERVISION OF OTHER NORMAL PREGNANCY, ANTEPARTUM: Primary | ICD-10-CM

## 2024-01-17 DIAGNOSIS — Z34.80 SUPERVISION OF OTHER NORMAL PREGNANCY, ANTEPARTUM: ICD-10-CM

## 2024-01-17 PROBLEM — Z37.9 NORMAL LABOR: Status: ACTIVE | Noted: 2024-01-17

## 2024-01-17 LAB
ABO GROUP BLD: NORMAL
AMPHET+METHAMPHET UR QL: NEGATIVE
BARBITURATES UR QL SCN: NEGATIVE
BASOPHILS # BLD AUTO: 0.04 10*3/MM3 (ref 0–0.2)
BASOPHILS NFR BLD AUTO: 0.4 % (ref 0–1.5)
BENZODIAZ UR QL SCN: NEGATIVE
BLD GP AB SCN SERPL QL: NEGATIVE
CANNABINOIDS SERPL QL: NEGATIVE
COCAINE UR QL: NEGATIVE
DEPRECATED RDW RBC AUTO: 40.4 FL (ref 37–54)
EOSINOPHIL # BLD AUTO: 0.19 10*3/MM3 (ref 0–0.4)
EOSINOPHIL NFR BLD AUTO: 1.7 % (ref 0.3–6.2)
ERYTHROCYTE [DISTWIDTH] IN BLOOD BY AUTOMATED COUNT: 13.5 % (ref 12.3–15.4)
FENTANYL UR-MCNC: NEGATIVE NG/ML
GLUCOSE UR STRIP-MCNC: NEGATIVE MG/DL
HCT VFR BLD AUTO: 34.9 % (ref 34–46.6)
HGB BLD-MCNC: 11.8 G/DL (ref 12–15.9)
IMM GRANULOCYTES # BLD AUTO: 0.06 10*3/MM3 (ref 0–0.05)
IMM GRANULOCYTES NFR BLD AUTO: 0.5 % (ref 0–0.5)
LYMPHOCYTES # BLD AUTO: 1.73 10*3/MM3 (ref 0.7–3.1)
LYMPHOCYTES NFR BLD AUTO: 15.8 % (ref 19.6–45.3)
MCH RBC QN AUTO: 27.9 PG (ref 26.6–33)
MCHC RBC AUTO-ENTMCNC: 33.8 G/DL (ref 31.5–35.7)
MCV RBC AUTO: 82.5 FL (ref 79–97)
METHADONE UR QL SCN: NEGATIVE
MONOCYTES # BLD AUTO: 0.44 10*3/MM3 (ref 0.1–0.9)
MONOCYTES NFR BLD AUTO: 4 % (ref 5–12)
NEUTROPHILS NFR BLD AUTO: 77.6 % (ref 42.7–76)
NEUTROPHILS NFR BLD AUTO: 8.51 10*3/MM3 (ref 1.7–7)
NRBC BLD AUTO-RTO: 0 /100 WBC (ref 0–0.2)
OPIATES UR QL: NEGATIVE
OXYCODONE UR QL SCN: NEGATIVE
PLATELET # BLD AUTO: 235 10*3/MM3 (ref 140–450)
PMV BLD AUTO: 10.3 FL (ref 6–12)
PROT UR STRIP-MCNC: NEGATIVE MG/DL
RBC # BLD AUTO: 4.23 10*6/MM3 (ref 3.77–5.28)
RH BLD: POSITIVE
T PALLIDUM IGG SER QL: NORMAL
T&S EXPIRATION DATE: NORMAL
WBC NRBC COR # BLD AUTO: 10.97 10*3/MM3 (ref 3.4–10.8)

## 2024-01-17 PROCEDURE — 80307 DRUG TEST PRSMV CHEM ANLYZR: CPT | Performed by: OBSTETRICS & GYNECOLOGY

## 2024-01-17 PROCEDURE — 59025 FETAL NON-STRESS TEST: CPT

## 2024-01-17 PROCEDURE — G0463 HOSPITAL OUTPT CLINIC VISIT: HCPCS

## 2024-01-17 PROCEDURE — 25810000003 LACTATED RINGERS PER 1000 ML: Performed by: OBSTETRICS & GYNECOLOGY

## 2024-01-17 PROCEDURE — 25010000002 PENICILLIN G POTASSIUM PER 600000 UNITS: Performed by: OBSTETRICS & GYNECOLOGY

## 2024-01-17 PROCEDURE — 25810000003 LACTATED RINGERS SOLUTION: Performed by: OBSTETRICS & GYNECOLOGY

## 2024-01-17 PROCEDURE — 85025 COMPLETE CBC W/AUTO DIFF WBC: CPT | Performed by: OBSTETRICS & GYNECOLOGY

## 2024-01-17 PROCEDURE — 25010000002 ONDANSETRON PER 1 MG: Performed by: OBSTETRICS & GYNECOLOGY

## 2024-01-17 PROCEDURE — C1755 CATHETER, INTRASPINAL: HCPCS | Performed by: NURSE ANESTHETIST, CERTIFIED REGISTERED

## 2024-01-17 PROCEDURE — 25010000002 ROPIVACAINE PER 1 MG: Performed by: NURSE ANESTHETIST, CERTIFIED REGISTERED

## 2024-01-17 PROCEDURE — 86780 TREPONEMA PALLIDUM: CPT | Performed by: OBSTETRICS & GYNECOLOGY

## 2024-01-17 PROCEDURE — 86901 BLOOD TYPING SEROLOGIC RH(D): CPT | Performed by: OBSTETRICS & GYNECOLOGY

## 2024-01-17 PROCEDURE — 86850 RBC ANTIBODY SCREEN: CPT | Performed by: OBSTETRICS & GYNECOLOGY

## 2024-01-17 PROCEDURE — 86900 BLOOD TYPING SEROLOGIC ABO: CPT | Performed by: OBSTETRICS & GYNECOLOGY

## 2024-01-17 RX ORDER — OXYTOCIN/0.9 % SODIUM CHLORIDE 30/500 ML
999 PLASTIC BAG, INJECTION (ML) INTRAVENOUS ONCE
Status: DISCONTINUED | OUTPATIENT
Start: 2024-01-17 | End: 2024-01-18 | Stop reason: HOSPADM

## 2024-01-17 RX ORDER — LIDOCAINE HYDROCHLORIDE AND EPINEPHRINE 15; 5 MG/ML; UG/ML
INJECTION, SOLUTION EPIDURAL
Status: COMPLETED | OUTPATIENT
Start: 2024-01-17 | End: 2024-01-17

## 2024-01-17 RX ORDER — ONDANSETRON 4 MG/1
4 TABLET, ORALLY DISINTEGRATING ORAL EVERY 6 HOURS PRN
Status: DISCONTINUED | OUTPATIENT
Start: 2024-01-17 | End: 2024-01-18 | Stop reason: HOSPADM

## 2024-01-17 RX ORDER — OXYTOCIN/0.9 % SODIUM CHLORIDE 30/500 ML
250 PLASTIC BAG, INJECTION (ML) INTRAVENOUS CONTINUOUS
Status: CANCELLED | OUTPATIENT
Start: 2024-01-17 | End: 2024-01-17

## 2024-01-17 RX ORDER — SODIUM CHLORIDE 9 MG/ML
40 INJECTION, SOLUTION INTRAVENOUS AS NEEDED
OUTPATIENT
Start: 2024-01-17

## 2024-01-17 RX ORDER — SODIUM CHLORIDE 0.9 % (FLUSH) 0.9 %
10 SYRINGE (ML) INJECTION AS NEEDED
Status: DISCONTINUED | OUTPATIENT
Start: 2024-01-17 | End: 2024-01-18 | Stop reason: HOSPADM

## 2024-01-17 RX ORDER — OXYTOCIN/0.9 % SODIUM CHLORIDE 30/500 ML
250 PLASTIC BAG, INJECTION (ML) INTRAVENOUS CONTINUOUS
Status: ACTIVE | OUTPATIENT
Start: 2024-01-18 | End: 2024-01-18

## 2024-01-17 RX ORDER — METHYLERGONOVINE MALEATE 0.2 MG/ML
200 INJECTION INTRAVENOUS ONCE AS NEEDED
Status: CANCELLED | OUTPATIENT
Start: 2024-01-17

## 2024-01-17 RX ORDER — IBUPROFEN 200 MG
600 TABLET ORAL EVERY 6 HOURS PRN
OUTPATIENT
Start: 2024-01-17

## 2024-01-17 RX ORDER — TERBUTALINE SULFATE 1 MG/ML
0.25 INJECTION, SOLUTION SUBCUTANEOUS AS NEEDED
Status: DISCONTINUED | OUTPATIENT
Start: 2024-01-17 | End: 2024-01-18 | Stop reason: HOSPADM

## 2024-01-17 RX ORDER — ONDANSETRON 4 MG/1
4 TABLET, ORALLY DISINTEGRATING ORAL EVERY 6 HOURS PRN
OUTPATIENT
Start: 2024-01-17

## 2024-01-17 RX ORDER — MORPHINE SULFATE 10 MG/ML
2 INJECTION INTRAMUSCULAR; INTRAVENOUS; SUBCUTANEOUS
OUTPATIENT
Start: 2024-01-17 | End: 2024-01-24

## 2024-01-17 RX ORDER — METHYLERGONOVINE MALEATE 0.2 MG/ML
200 INJECTION INTRAVENOUS ONCE AS NEEDED
Status: DISCONTINUED | OUTPATIENT
Start: 2024-01-17 | End: 2024-01-18 | Stop reason: HOSPADM

## 2024-01-17 RX ORDER — ACETAMINOPHEN 325 MG/1
1000 TABLET ORAL EVERY 6 HOURS
OUTPATIENT
Start: 2024-01-17

## 2024-01-17 RX ORDER — ROPIVACAINE HYDROCHLORIDE 2 MG/ML
INJECTION, SOLUTION EPIDURAL; INFILTRATION; PERINEURAL
Status: COMPLETED
Start: 2024-01-17 | End: 2024-01-17

## 2024-01-17 RX ORDER — SODIUM CHLORIDE 9 MG/ML
40 INJECTION, SOLUTION INTRAVENOUS AS NEEDED
Status: DISCONTINUED | OUTPATIENT
Start: 2024-01-17 | End: 2024-01-18 | Stop reason: HOSPADM

## 2024-01-17 RX ORDER — OXYTOCIN/0.9 % SODIUM CHLORIDE 30/500 ML
999 PLASTIC BAG, INJECTION (ML) INTRAVENOUS ONCE
Status: CANCELLED | OUTPATIENT
Start: 2024-01-17 | End: 2024-01-17

## 2024-01-17 RX ORDER — HYDROXYZINE HYDROCHLORIDE 10 MG/1
50 TABLET, FILM COATED ORAL NIGHTLY PRN
OUTPATIENT
Start: 2024-01-17

## 2024-01-17 RX ORDER — MISOPROSTOL 100 UG/1
800 TABLET ORAL AS NEEDED
Status: CANCELLED | OUTPATIENT
Start: 2024-01-17

## 2024-01-17 RX ORDER — CITRIC ACID/SODIUM CITRATE 334-500MG
30 SOLUTION, ORAL ORAL ONCE AS NEEDED
OUTPATIENT
Start: 2024-01-17

## 2024-01-17 RX ORDER — ACETAMINOPHEN 325 MG/1
650 TABLET ORAL EVERY 4 HOURS PRN
Status: DISCONTINUED | OUTPATIENT
Start: 2024-01-17 | End: 2024-01-18 | Stop reason: HOSPADM

## 2024-01-17 RX ORDER — OXYTOCIN/0.9 % SODIUM CHLORIDE 30/500 ML
2-20 PLASTIC BAG, INJECTION (ML) INTRAVENOUS
OUTPATIENT
Start: 2024-01-17

## 2024-01-17 RX ORDER — EPHEDRINE SULFATE 50 MG/ML
5 INJECTION, SOLUTION INTRAVENOUS
Status: DISCONTINUED | OUTPATIENT
Start: 2024-01-17 | End: 2024-01-18 | Stop reason: HOSPADM

## 2024-01-17 RX ORDER — METHYLERGONOVINE MALEATE 0.2 MG/ML
200 INJECTION INTRAVENOUS ONCE AS NEEDED
Status: DISCONTINUED | OUTPATIENT
Start: 2024-01-17 | End: 2024-01-17

## 2024-01-17 RX ORDER — OXYTOCIN/0.9 % SODIUM CHLORIDE 30/500 ML
999 PLASTIC BAG, INJECTION (ML) INTRAVENOUS ONCE
Status: COMPLETED | OUTPATIENT
Start: 2024-01-18 | End: 2024-01-18

## 2024-01-17 RX ORDER — TERBUTALINE SULFATE 1 MG/ML
0.25 INJECTION, SOLUTION SUBCUTANEOUS AS NEEDED
OUTPATIENT
Start: 2024-01-17

## 2024-01-17 RX ORDER — CARBOPROST TROMETHAMINE 250 UG/ML
250 INJECTION, SOLUTION INTRAMUSCULAR AS NEEDED
Status: DISCONTINUED | OUTPATIENT
Start: 2024-01-17 | End: 2024-01-18 | Stop reason: HOSPADM

## 2024-01-17 RX ORDER — CARBOPROST TROMETHAMINE 250 UG/ML
250 INJECTION, SOLUTION INTRAMUSCULAR AS NEEDED
Status: CANCELLED | OUTPATIENT
Start: 2024-01-17

## 2024-01-17 RX ORDER — SODIUM CHLORIDE 0.9 % (FLUSH) 0.9 %
10 SYRINGE (ML) INJECTION AS NEEDED
OUTPATIENT
Start: 2024-01-17

## 2024-01-17 RX ORDER — ROPIVACAINE HYDROCHLORIDE 2 MG/ML
INJECTION, SOLUTION EPIDURAL; INFILTRATION; PERINEURAL
Status: COMPLETED | OUTPATIENT
Start: 2024-01-17 | End: 2024-01-17

## 2024-01-17 RX ORDER — CITRIC ACID/SODIUM CITRATE 334-500MG
30 SOLUTION, ORAL ORAL ONCE AS NEEDED
Status: DISCONTINUED | OUTPATIENT
Start: 2024-01-17 | End: 2024-01-18 | Stop reason: HOSPADM

## 2024-01-17 RX ORDER — SODIUM CHLORIDE 0.9 % (FLUSH) 0.9 %
10 SYRINGE (ML) INJECTION EVERY 12 HOURS SCHEDULED
OUTPATIENT
Start: 2024-01-17

## 2024-01-17 RX ORDER — ONDANSETRON 2 MG/ML
4 INJECTION INTRAMUSCULAR; INTRAVENOUS EVERY 6 HOURS PRN
OUTPATIENT
Start: 2024-01-17

## 2024-01-17 RX ORDER — CARBOPROST TROMETHAMINE 250 UG/ML
250 INJECTION, SOLUTION INTRAMUSCULAR AS NEEDED
Status: DISCONTINUED | OUTPATIENT
Start: 2024-01-17 | End: 2024-01-17

## 2024-01-17 RX ORDER — LIDOCAINE HYDROCHLORIDE 10 MG/ML
0.5 INJECTION, SOLUTION INFILTRATION; PERINEURAL ONCE AS NEEDED
Status: DISCONTINUED | OUTPATIENT
Start: 2024-01-17 | End: 2024-01-17

## 2024-01-17 RX ORDER — GUAIFENESIN 200 MG/10ML
200 LIQUID ORAL 3 TIMES DAILY PRN
COMMUNITY
End: 2024-01-19 | Stop reason: HOSPADM

## 2024-01-17 RX ORDER — SODIUM CHLORIDE, SODIUM LACTATE, POTASSIUM CHLORIDE, CALCIUM CHLORIDE 600; 310; 30; 20 MG/100ML; MG/100ML; MG/100ML; MG/100ML
125 INJECTION, SOLUTION INTRAVENOUS CONTINUOUS
Status: DISCONTINUED | OUTPATIENT
Start: 2024-01-17 | End: 2024-01-18

## 2024-01-17 RX ORDER — ACETAMINOPHEN 325 MG/1
625 TABLET ORAL EVERY 4 HOURS PRN
OUTPATIENT
Start: 2024-01-17

## 2024-01-17 RX ORDER — MISOPROSTOL 200 UG/1
800 TABLET ORAL AS NEEDED
Status: DISCONTINUED | OUTPATIENT
Start: 2024-01-17 | End: 2024-01-18 | Stop reason: HOSPADM

## 2024-01-17 RX ORDER — LIDOCAINE HYDROCHLORIDE 10 MG/ML
0.5 INJECTION, SOLUTION INFILTRATION; PERINEURAL ONCE AS NEEDED
Status: CANCELLED | OUTPATIENT
Start: 2024-01-17

## 2024-01-17 RX ORDER — SODIUM CHLORIDE 0.9 % (FLUSH) 0.9 %
10 SYRINGE (ML) INJECTION EVERY 12 HOURS SCHEDULED
Status: DISCONTINUED | OUTPATIENT
Start: 2024-01-17 | End: 2024-01-18 | Stop reason: HOSPADM

## 2024-01-17 RX ORDER — SODIUM CHLORIDE, SODIUM LACTATE, POTASSIUM CHLORIDE, CALCIUM CHLORIDE 600; 310; 30; 20 MG/100ML; MG/100ML; MG/100ML; MG/100ML
125 INJECTION, SOLUTION INTRAVENOUS CONTINUOUS
OUTPATIENT
Start: 2024-01-17

## 2024-01-17 RX ORDER — MISOPROSTOL 200 UG/1
800 TABLET ORAL AS NEEDED
Status: DISCONTINUED | OUTPATIENT
Start: 2024-01-17 | End: 2024-01-17

## 2024-01-17 RX ORDER — ONDANSETRON 2 MG/ML
4 INJECTION INTRAMUSCULAR; INTRAVENOUS EVERY 6 HOURS PRN
Status: DISCONTINUED | OUTPATIENT
Start: 2024-01-17 | End: 2024-01-18 | Stop reason: HOSPADM

## 2024-01-17 RX ORDER — LIDOCAINE HYDROCHLORIDE 10 MG/ML
0.5 INJECTION, SOLUTION INFILTRATION; PERINEURAL ONCE AS NEEDED
Status: DISCONTINUED | OUTPATIENT
Start: 2024-01-17 | End: 2024-01-18 | Stop reason: HOSPADM

## 2024-01-17 RX ADMIN — ROPIVACAINE HYDROCHLORIDE 8 ML: 2 INJECTION, SOLUTION EPIDURAL; INFILTRATION; PERINEURAL at 20:48

## 2024-01-17 RX ADMIN — ONDANSETRON 4 MG: 2 INJECTION INTRAMUSCULAR; INTRAVENOUS at 21:11

## 2024-01-17 RX ADMIN — SODIUM CHLORIDE, POTASSIUM CHLORIDE, SODIUM LACTATE AND CALCIUM CHLORIDE 125 ML/HR: 600; 310; 30; 20 INJECTION, SOLUTION INTRAVENOUS at 21:11

## 2024-01-17 RX ADMIN — SODIUM CHLORIDE 5 MILLION UNITS: 900 INJECTION INTRAVENOUS at 19:29

## 2024-01-17 RX ADMIN — LIDOCAINE HYDROCHLORIDE AND EPINEPHRINE 2 ML: 15; 5 INJECTION, SOLUTION EPIDURAL at 20:45

## 2024-01-17 RX ADMIN — SODIUM CHLORIDE, POTASSIUM CHLORIDE, SODIUM LACTATE AND CALCIUM CHLORIDE 1000 ML: 600; 310; 30; 20 INJECTION, SOLUTION INTRAVENOUS at 19:26

## 2024-01-17 RX ADMIN — SODIUM CHLORIDE, POTASSIUM CHLORIDE, SODIUM LACTATE AND CALCIUM CHLORIDE 125 ML/HR: 600; 310; 30; 20 INJECTION, SOLUTION INTRAVENOUS at 19:29

## 2024-01-17 RX ADMIN — Medication 10 ML/HR: at 20:51

## 2024-01-17 RX ADMIN — SODIUM CHLORIDE 2.5 MILLION UNITS: 9 INJECTION, SOLUTION INTRAVENOUS at 23:02

## 2024-01-17 NOTE — PROGRESS NOTES
OB FOLLOW UP  Complaint   Chief Complaint   Patient presents with    Routine Prenatal Visit            Solange Guzmán is a 28 y.o.  38w5d patient being seen today for her obstetrical follow up visit.  Patient evaluated in triage earlier today after noting vaginal spotting.  Lost her mucous plug approximately 1 week ago.  Reports that contractions have been anywhere between 6 to 7 minutes.  She was evaluated for over an hour with no cervical change observed on examination.  Patient lives approximately 15 minutes from hospital.    Her prenatal care is complicated by (and status) :    Patient Active Problem List   Diagnosis    Obesity without serious comorbidity    Supervision of other normal pregnancy, antepartum    Obesity affecting pregnancy, antepartum    History of macrosomia in infant in prior pregnancy, currently pregnant in third trimester    Asthma affecting pregnancy, antepartum       All other systems reviewed and are negative.     The additional following portions of the patient's history were reviewed and updated as appropriate: allergies, current medications, past family history, past medical history, past social history, past surgical history, and problem list.      EXAM:     Vital signs: /73   Wt 116 kg (256 lb)   LMP 2023   BMI 40.10 kg/m²   Appearance/psychiatric: To be in no distress  Constitutional: The patient is well nourished.  Cardiovascular: She does not have edema.  Respiratory: Respiratory effort is normal.  Gastrointestinal: Abdomen is soft, gravid, nontender, no rashes, heart tones are present, fundal height is  40 cm    Pelvic Exam: Yes.  Presentation: cephalic. Dilation: 4 centimeters. Effacement: 60. Station: -3.    Urine glucose/protein: See prenatal flowsheet       Assessment and Plan    Problem List Items Addressed This Visit          Gravid and     Supervision of other normal pregnancy, antepartum - Primary    Overview     KEEGAN finalize:Estimated  Date of Delivery: 1/26/24    Genetic testing (NIPS-Quad)/CF/AFP:  Declined     COVID-19 vaccine: Recommended  Flu vaccine: Recommended, Rx 10/18/2023  Tdap vaccine: Rx 10/18/2023  RSV vaccine: Rx 10/18/2023    Rhogam: A Positive    Anatomy US:Growth 53%, AC 50%, consistent with dates,  FHTS 148, all anatomy seen and WNL, Anterior placenta-grade 1, 3VC with normal insertion, vertex, male           Relevant Orders    POC Urinalysis Dipstick (Completed)       Impression  Pregnancy at 38w5d  Fetal status reassuring.   Activity and Exercise discussed.    Plan  Patient scheduled for induction of labor this coming Friday.  Orders placed.  Penicillin ordered for positive GBS status.  Return to office in 6 weeks for postpartum follow-up  Strict labor cautions provided      Patient was counseled to the following pregnancy precautions:  Decreased fetal movement, if concern for decreased fetal movement please perform fetal kick counts you are looking for 10 movements in 2 hours.  If concern for fetal movement and not meeting that criteria, please present to triage for evaluation.  Contractions occurring every 5 minutes for over an hour, lasting 30 to 60 seconds and progressively causing more discomfort, please seek medical attention to rule out labor  If you believe that your water is broken, place a sanitary pad.  If pad fills in short period of time i.e. less than 5 minutes, take off pad placed another pad.  If this is saturated please present for rule out rupture of membranes  Vaginal bleeding can be normal in pregnancy, this usually takes a form of spotting.  If having heavier bleeding like a menstrual period please present for evaluation; especially in light of severe abdominal pain this could represent a placental abruption.  Keep all scheduled appointments as recommended.        Alfonso Rahman MD  01/17/2024

## 2024-01-17 NOTE — NON STRESS TEST
Obstetrical Non-stress Test Interpretation     Name:  Solange Guzmán  MRN: [id number]    28 y.o. female  at 38w5d    Indication: contractions, spotting       Fetal Assessment  Fetal Movement: active  Fetal HR Assessment Method: external  Fetal HR (beats/min): 130  Fetal HR Baseline: normal range  Fetal HR Variability: moderate (amplitude range 6 to 25 bpm)  Fetal HR Accelerations: greater than/equal to 15 bpm, lasting at least 15 seconds  Fetal HR Decelerations: absent  Sinusoidal Pattern Present: absent    BP 99/72 (BP Location: Right arm)   Pulse 95   Resp 18   LMP 2023   SpO2 100%     Reason for test: OB Triage (contractions, spotting)  Date of Test: 2024  Time frame of test: 7253-4201  RN NST Interpretation: Reactive      Halima Elliott RN  2024  12:20 EST

## 2024-01-18 PROBLEM — O36.60X0 MACROSOMIA AFFECTING MANAGEMENT OF MOTHER: Status: ACTIVE | Noted: 2024-01-18

## 2024-01-18 LAB
BASE EXCESS BLDCOA CALC-SCNC: -7.3 MMOL/L (ref -2–2)
BASE EXCESS BLDCOV CALC-SCNC: -2.8 MMOL/L (ref -2–2)
HCO3 BLDCOA-SCNC: 24.2 MMOL/L
HCO3 BLDCOV-SCNC: 23.1 MMOL/L
PCO2 BLDCOA: 79.3 MMHG (ref 33–49)
PCO2 BLDCOV: 44.3 MM HG (ref 28–40)
PH BLDCOA: 7.1 PH UNITS (ref 7.21–7.31)
PH BLDCOV: 7.33 PH UNITS (ref 7.31–7.37)
PO2 BLDCOA: <40.5 MMHG
PO2 BLDCOV: <40.5 MM HG (ref 21–31)

## 2024-01-18 PROCEDURE — 94799 UNLISTED PULMONARY SVC/PX: CPT

## 2024-01-18 PROCEDURE — 94664 DEMO&/EVAL PT USE INHALER: CPT

## 2024-01-18 PROCEDURE — 94640 AIRWAY INHALATION TREATMENT: CPT

## 2024-01-18 PROCEDURE — 51702 INSERT TEMP BLADDER CATH: CPT

## 2024-01-18 PROCEDURE — 82803 BLOOD GASES ANY COMBINATION: CPT | Performed by: STUDENT IN AN ORGANIZED HEALTH CARE EDUCATION/TRAINING PROGRAM

## 2024-01-18 PROCEDURE — 0KQM0ZZ REPAIR PERINEUM MUSCLE, OPEN APPROACH: ICD-10-PCS | Performed by: OBSTETRICS & GYNECOLOGY

## 2024-01-18 RX ORDER — FERROUS SULFATE 325(65) MG
325 TABLET ORAL 2 TIMES DAILY WITH MEALS
Status: DISCONTINUED | OUTPATIENT
Start: 2024-01-18 | End: 2024-01-19 | Stop reason: HOSPADM

## 2024-01-18 RX ORDER — ONDANSETRON 2 MG/ML
4 INJECTION INTRAMUSCULAR; INTRAVENOUS EVERY 6 HOURS PRN
Status: DISCONTINUED | OUTPATIENT
Start: 2024-01-18 | End: 2024-01-18 | Stop reason: HOSPADM

## 2024-01-18 RX ORDER — ONDANSETRON 4 MG/1
4 TABLET, ORALLY DISINTEGRATING ORAL EVERY 8 HOURS PRN
Status: DISCONTINUED | OUTPATIENT
Start: 2024-01-18 | End: 2024-01-19 | Stop reason: HOSPADM

## 2024-01-18 RX ORDER — HYDROCODONE BITARTRATE AND ACETAMINOPHEN 10; 325 MG/1; MG/1
1 TABLET ORAL EVERY 4 HOURS PRN
Status: DISCONTINUED | OUTPATIENT
Start: 2024-01-18 | End: 2024-01-19 | Stop reason: HOSPADM

## 2024-01-18 RX ORDER — ONDANSETRON 4 MG/1
4 TABLET, ORALLY DISINTEGRATING ORAL EVERY 6 HOURS PRN
Status: DISCONTINUED | OUTPATIENT
Start: 2024-01-18 | End: 2024-01-18 | Stop reason: HOSPADM

## 2024-01-18 RX ORDER — HYDROCODONE BITARTRATE AND ACETAMINOPHEN 5; 325 MG/1; MG/1
1 TABLET ORAL EVERY 4 HOURS PRN
Status: DISCONTINUED | OUTPATIENT
Start: 2024-01-18 | End: 2024-01-19 | Stop reason: HOSPADM

## 2024-01-18 RX ORDER — OXYTOCIN/0.9 % SODIUM CHLORIDE 30/500 ML
125 PLASTIC BAG, INJECTION (ML) INTRAVENOUS CONTINUOUS PRN
Status: DISCONTINUED | OUTPATIENT
Start: 2024-01-18 | End: 2024-01-19 | Stop reason: HOSPADM

## 2024-01-18 RX ORDER — DOCUSATE SODIUM 100 MG/1
100 CAPSULE, LIQUID FILLED ORAL DAILY
Status: DISCONTINUED | OUTPATIENT
Start: 2024-01-18 | End: 2024-01-19 | Stop reason: HOSPADM

## 2024-01-18 RX ORDER — CALCIUM CARBONATE 500 MG/1
2 TABLET, CHEWABLE ORAL 3 TIMES DAILY PRN
Status: DISCONTINUED | OUTPATIENT
Start: 2024-01-18 | End: 2024-01-19 | Stop reason: HOSPADM

## 2024-01-18 RX ORDER — BUDESONIDE AND FORMOTEROL FUMARATE DIHYDRATE 160; 4.5 UG/1; UG/1
2 AEROSOL RESPIRATORY (INHALATION)
Status: DISCONTINUED | OUTPATIENT
Start: 2024-01-18 | End: 2024-01-19 | Stop reason: HOSPADM

## 2024-01-18 RX ORDER — MONTELUKAST SODIUM 10 MG/1
10 TABLET ORAL NIGHTLY
Status: DISCONTINUED | OUTPATIENT
Start: 2024-01-18 | End: 2024-01-19 | Stop reason: HOSPADM

## 2024-01-18 RX ORDER — IBUPROFEN 600 MG/1
600 TABLET ORAL EVERY 6 HOURS PRN
Status: DISCONTINUED | OUTPATIENT
Start: 2024-01-18 | End: 2024-01-19 | Stop reason: HOSPADM

## 2024-01-18 RX ORDER — GUAIFENESIN 200 MG/10ML
200 LIQUID ORAL 3 TIMES DAILY PRN
Status: DISCONTINUED | OUTPATIENT
Start: 2024-01-18 | End: 2024-01-19 | Stop reason: HOSPADM

## 2024-01-18 RX ORDER — IBUPROFEN 600 MG/1
600 TABLET ORAL EVERY 6 HOURS PRN
Status: DISCONTINUED | OUTPATIENT
Start: 2024-01-18 | End: 2024-01-18 | Stop reason: HOSPADM

## 2024-01-18 RX ORDER — ACETAMINOPHEN 325 MG/1
650 TABLET ORAL EVERY 6 HOURS PRN
Status: DISCONTINUED | OUTPATIENT
Start: 2024-01-18 | End: 2024-01-19 | Stop reason: HOSPADM

## 2024-01-18 RX ADMIN — MONTELUKAST 10 MG: 10 TABLET, FILM COATED ORAL at 21:30

## 2024-01-18 RX ADMIN — IBUPROFEN 600 MG: 600 TABLET, FILM COATED ORAL at 10:35

## 2024-01-18 RX ADMIN — ACETAMINOPHEN 650 MG: 325 TABLET ORAL at 21:30

## 2024-01-18 RX ADMIN — ACETAMINOPHEN 650 MG: 325 TABLET ORAL at 05:51

## 2024-01-18 RX ADMIN — IBUPROFEN 600 MG: 600 TABLET, FILM COATED ORAL at 04:03

## 2024-01-18 RX ADMIN — BUDESONIDE AND FORMOTEROL FUMARATE DIHYDRATE 2 PUFF: 160; 4.5 AEROSOL RESPIRATORY (INHALATION) at 21:34

## 2024-01-18 RX ADMIN — IBUPROFEN 600 MG: 600 TABLET, FILM COATED ORAL at 17:41

## 2024-01-18 RX ADMIN — FERROUS SULFATE TAB 325 MG (65 MG ELEMENTAL FE) 325 MG: 325 (65 FE) TAB at 08:33

## 2024-01-18 RX ADMIN — WITCH HAZEL: 500 SOLUTION RECTAL; TOPICAL at 03:05

## 2024-01-18 RX ADMIN — DOCUSATE SODIUM 100 MG: 100 CAPSULE, LIQUID FILLED ORAL at 08:33

## 2024-01-18 RX ADMIN — ACETAMINOPHEN 650 MG: 325 TABLET ORAL at 14:18

## 2024-01-18 RX ADMIN — Medication: at 03:04

## 2024-01-18 RX ADMIN — BUDESONIDE AND FORMOTEROL FUMARATE DIHYDRATE 2 PUFF: 160; 4.5 AEROSOL RESPIRATORY (INHALATION) at 07:33

## 2024-01-18 RX ADMIN — Medication 999 ML/HR: at 00:07

## 2024-01-18 RX ADMIN — FERROUS SULFATE TAB 325 MG (65 MG ELEMENTAL FE) 325 MG: 325 (65 FE) TAB at 17:41

## 2024-01-18 NOTE — PROGRESS NOTES
"ANUJA Lawson  Obstetric Progress Note    Subjective     Patient:    The patient feels well.      Objective     Vital Signs Range for the last 24 hours  Temp:  [97.5 °F (36.4 °C)] 97.5 °F (36.4 °C)   Temp src: Oral   BP: ()/(25-99) 99/25   Heart Rate:  [] 74   Resp:  [16-18] 16   SpO2:  [98 %-100 %] 100 %           Weight:  [116 kg (256 lb)] 116 kg (256 lb)       Flowsheet Rows      Flowsheet Row First Filed Value   Admission Height 170.2 cm (67\") Documented at 01/17/2024 1938   Admission Weight 116 kg (256 lb) Documented at 01/17/2024 1938            Intake/Output last 24 hours:      Intake/Output Summary (Last 24 hours) at 1/17/2024 2326  Last data filed at 1/17/2024 2303  Gross per 24 hour   Intake 1341 ml   Output 200 ml   Net 1141 ml       Intake/Output this shift:    I/O this shift:  In: 1341 [I.V.:1241; IV Piggyback:100]  Out: 200 [Urine:200]    Physical Exam:  General: Patient is in no acute distress   Heart CVS exam: normal rate, regular rhythm, normal S1, S2, no murmurs, rubs, clicks or gallops.   Lungs Chest: clear to auscultation, no wheezes, rales or rhonchi, symmetric air entry.     Abdomen Abdominal exam: soft, nontender, nondistended, no masses or organomegaly.   Extremities Exam of extremities: peripheral pulses normal, no pedal edema, no clubbing or cyanosis     Presentation: vertex   Cervix: Exam by: Method: sterile exam per physician (Dr. King)   Dilation: Cervical Dilation (cm): 10   Effacement: Cervical Effacement: 100%   Station:           Fetal Heart Rate Assessment   Method: Fetal HR Assessment Method: external   Beats/min: Fetal HR (beats/min): 125   Baseline: Fetal HR Baseline: normal range   Variability: Fetal HR Variability: moderate (amplitude range 6 to 25 bpm)   Accels: Fetal HR Accelerations: greater than/equal to 15 bpm, lasting at least 15 seconds   Decels: Fetal HR Decelerations: early   Tracing Category:       Uterine Assessment   Method: Method: palpation, external " tocotransducer   Frequency (min): Contraction Frequency (Minutes): 3   Ctx Count in 10 min:     Duration:     Intensity: Contraction Intensity: strong by palpation   Intensity by IUPC:     Resting Tone: Uterine Resting Tone: soft by palpation   Resting Tone by IUPC:     [place] Units:         Assessment & Plan       Normal labor        Assessment:  1.  Intrauterine pregnancy at 38w5d gestation with reactive fetal status.    2.  labor  with ROM  3.  Obstetrical history significant for  . Macrosomia .  4.  GBS status:   Group B Strep, DNA   Date Value Ref Range Status   2023 Positive (A) Negative Final       Plan:  1. Vaginal anticipated  2. Plan of care has been reviewed with patient and spouse  3.  Risks, benefits of treatment plan have been discussed.  4.  All questions have been answered.  5.  Prepare for stage II labor      Kristin King MD  2024  23:26 EST

## 2024-01-18 NOTE — ANESTHESIA PREPROCEDURE EVALUATION
Anesthesia Evaluation     Patient summary reviewed and Nursing notes reviewed   no history of anesthetic complications:   NPO Solid Status: < 2 hours  NPO Liquid Status: < 2 hours           Airway   Mallampati: II  TM distance: >3 FB  Neck ROM: full  No difficulty expected  Dental - normal exam     Pulmonary - negative pulmonary ROS and normal exam    breath sounds clear to auscultation  (+) asthma,  Cardiovascular - negative cardio ROS and normal exam  Exercise tolerance: good (4-7 METS)    Rhythm: regular  Rate: normal        Neuro/Psych- negative ROS  GI/Hepatic/Renal/Endo    (+) GERD well controlled    Musculoskeletal (-) negative ROS    Abdominal    Substance History - negative use     OB/GYN    (+) Pregnant        Other - negative ROS       ROS/Med Hx Other:                Anesthesia Plan    ASA 2     epidural       Anesthetic plan, risks, benefits, and alternatives have been provided, discussed and informed consent has been obtained with: patient.    Plan discussed with CRNA.    CODE STATUS:    Level Of Support Discussed With: Patient  Code Status (Patient has no pulse and is not breathing): CPR (Attempt to Resuscitate)  Medical Interventions (Patient has pulse or is breathing): Full

## 2024-01-18 NOTE — PLAN OF CARE
Problem: Adult Inpatient Plan of Care  Goal: Plan of Care Review  1/18/2024 0249 by Jayshree Shaw RN  Outcome: Ongoing, Progressing  1/17/2024 1946 by Jayshree Shaw RN  Outcome: Ongoing, Progressing  Goal: Patient-Specific Goal (Individualized)  1/18/2024 0249 by Jayshree Shaw RN  Outcome: Ongoing, Progressing  1/17/2024 1946 by Jayshree Shaw RN  Outcome: Ongoing, Progressing  Goal: Absence of Hospital-Acquired Illness or Injury  1/18/2024 0249 by Jayshree Shaw RN  Outcome: Ongoing, Progressing  1/17/2024 1946 by Jayshree Shaw RN  Outcome: Ongoing, Progressing  Intervention: Identify and Manage Fall Risk  Recent Flowsheet Documentation  Taken 1/17/2024 2203 by Jayshere Shaw RN  Safety Promotion/Fall Prevention:   safety round/check completed   room organization consistent   nonskid shoes/slippers when out of bed   fall prevention program maintained   assistive device/personal items within reach   clutter free environment maintained  Taken 1/17/2024 2136 by Jayshree Shaw RN  Safety Promotion/Fall Prevention:   assistive device/personal items within reach   clutter free environment maintained   safety round/check completed   room organization consistent   nonskid shoes/slippers when out of bed   fall prevention program maintained   activity supervised  Intervention: Prevent and Manage VTE (Venous Thromboembolism) Risk  Recent Flowsheet Documentation  Taken 1/18/2024 0034 by Jayshree Shaw RN  VTE Prevention/Management:   bilateral   sequential compression devices on  Taken 1/17/2024 2345 by Jayshree Shaw RN  VTE Prevention/Management:   bilateral   sequential compression devices off  Taken 1/17/2024 2113 by Jayshree Shaw RN  VTE Prevention/Management:   bilateral   sequential compression devices on  Goal: Optimal Comfort and Wellbeing  1/18/2024 0249 by Jayshree Shaw RN  Outcome: Ongoing, Progressing  1/17/2024 1946 by Jayshree Shaw RN  Outcome: Ongoing,  Progressing  Intervention: Monitor Pain and Promote Comfort  Recent Flowsheet Documentation  Taken 1/17/2024 2203 by Jayshree Shaw RN  Pain Management Interventions: (epidural infusing) pain pump in use  Intervention: Provide Person-Centered Care  Recent Flowsheet Documentation  Taken 1/17/2024 2203 by Jayshree Shaw RN  Trust Relationship/Rapport:   care explained   choices provided   emotional support provided   empathic listening provided   questions answered   thoughts/feelings acknowledged   reassurance provided   questions encouraged  Goal: Readiness for Transition of Care  1/18/2024 0249 by Jayshree Shaw RN  Outcome: Ongoing, Progressing  1/17/2024 1946 by Jayshree Shaw RN  Outcome: Ongoing, Progressing  Intervention: Mutually Develop Transition Plan  Recent Flowsheet Documentation  Taken 1/17/2024 1943 by Jayshree Shaw RN  Equipment Currently Used at Home: none  Taken 1/17/2024 1941 by Jayshree Shaw RN  Transportation Anticipated: family or friend will provide  Patient/Family Anticipated Services at Transition: none  Patient/Family Anticipates Transition to: home     Problem: Adjustment to Role Transition (Postpartum Vaginal Delivery)  Goal: Successful Maternal Role Transition  Outcome: Ongoing, Progressing     Problem: Bleeding (Postpartum Vaginal Delivery)  Goal: Hemostasis  Outcome: Ongoing, Progressing     Problem: Infection (Postpartum Vaginal Delivery)  Goal: Absence of Infection Signs/Symptoms  Outcome: Ongoing, Progressing     Problem: Pain (Postpartum Vaginal Delivery)  Goal: Acceptable Pain Control  Outcome: Ongoing, Progressing  Intervention: Prevent or Manage Pain  Recent Flowsheet Documentation  Taken 1/17/2024 2203 by Jayhsree Shaw RN  Pain Management Interventions: (epidural infusing) pain pump in use     Problem: Urinary Retention (Postpartum Vaginal Delivery)  Goal: Effective Urinary Elimination  Outcome: Ongoing, Progressing   Goal Outcome Evaluation:

## 2024-01-18 NOTE — ANESTHESIA POSTPROCEDURE EVALUATION
Patient: Solange Guzmán    Procedure Summary       Date: 01/17/24 Room / Location:     Anesthesia Start: 2037 Anesthesia Stop: 2357    Procedure: LABOR ANALGESIA Diagnosis:     Scheduled Providers:  Provider: Rc Salas CRNA    Anesthesia Type: epidural ASA Status: 2            Anesthesia Type: epidural    Vitals  Vitals Value Taken Time   /63 01/18/24 0555   Temp 36.4 °C (97.5 °F) 01/18/24 0555   Pulse 73 01/18/24 0555   Resp 16 01/18/24 0555   SpO2 100 % 01/18/24 0733           Post Anesthesia Care and Evaluation    Patient location during evaluation: bedside  Patient participation: complete - patient participated  Level of consciousness: awake  Pain score: 1  Pain management: adequate    Airway patency: patent  Anesthetic complications: No anesthetic complications  PONV Status: controlled  Cardiovascular status: acceptable and stable  Respiratory status: acceptable  Hydration status: acceptable  Post Neuraxial Block status: Motor and sensory function returned to baseline and No signs or symptoms of PDPH

## 2024-01-18 NOTE — ANESTHESIA PROCEDURE NOTES
Labor Epidural      Patient reassessed immediately prior to procedure    Patient location during procedure: OB  Start Time: 1/17/2024 8:40 PM  Stop Time: 1/17/2024 8:48 PM  Performed By  NATHALIE/CAA: Rc Salas CRNA  Preanesthetic Checklist  Completed: patient identified, IV checked, risks and benefits discussed, surgical consent, monitors and equipment checked, pre-op evaluation and timeout performed  Prep:  Pt Position:sitting  Sterile Tech:cap, gloves, sterile barrier, mask and gown  Prep:chlorhexidine gluconate and isopropyl alcohol  Monitoring:blood pressure monitoring, continuous pulse oximetry and EKG  Epidural Block Procedure:  Approach:midline  Guidance:landmark technique and palpation technique  Needle Type:Tuohy  Needle Gauge:17 G  Loss of Resistance Medium: air  Loss of Resistance: 10cm  Cath Depth at skin:14 cm  Paresthesia: none  Aspiration:negative  Test Dose:negative  Medication: lidocaine 1.5%-EPINEPHrine 1:200,000 (XYLOCAINE W/EPI) injection - Epidural   2 mL - 1/17/2024 8:45:00 PM  ropivacaine (NAROPIN) 0.2 % injection - Injection   8 mL - 1/17/2024 8:48:00 PM  Number of Attempts: 1  Post Assessment:  Dressing:occlusive dressing applied and secured with tape  Pt Tolerance:patient tolerated the procedure well with no apparent complications  Complications:no

## 2024-01-18 NOTE — H&P
ANUJA Lawson  Obstetric History and Physical    Chief Complaint   Patient presents with    Contractions       Subjective     HPI:    Patient is a 28 y.o. female  currently at 38w5d, who presents with contractions getting stronger and closer;  no LOF, no vb;  good FM.    Her prenatal care is complicated by  abnormal fetal growth  macrosomia - PP 9#4oz; Her previous obstetric/gynecological history is noted for is non-contributory.    The following portions of the patients history were reviewed and updated as appropriate:   current medications, allergies, past medical history, past surgical history, past family history, past social history and current problem list.     Prenatal Information:  Prenatal Results       Initial Prenatal Labs       Test Value Reference Range Date Time    Hemoglobin  13.5 g/dL 12.0 - 15.9 23 1035    Hematocrit  39.1 % 34.0 - 46.6 23 1035    Platelets  244 10*3/mm3 140 - 450 23 1035    Rubella IgG  5.47 index Immune >0.99 23 1035    Hepatitis B SAg  Non-Reactive  Non-Reactive 23 1035    Hepatitis C Ab  Non-Reactive  Non-Reactive 23 1035    RPR ^ Negative   21     T. Pallidum Ab   Non-Reactive  Non-Reactive 23 1035    ABO  A   23 1035    Rh  Positive   23 1035    Antibody Screen  Negative   23 1035    HIV  Non-Reactive  Non-Reactive 23 1035    Urine Culture  50,000 CFU/mL Normal Urogenital Jessica   23 1020    Gonorrhea  Negative  Negative 23 1020    Chlamydia  Negative  Negative 23 1020    TSH        HgB A1c         Varicella IgG        HgB Electrophoresis         Cystic fibrosis                   Fetal testing        Test Value Reference Range Date Time    NIPT        MSAFP        AFP-4                  2nd and 3rd Trimester       Test Value Reference Range Date Time    Hemoglobin (repeated)  11.8 g/dL 12.0 - 15.9 10/18/23 1153       11.5 g/dL 12.0 - 15.9 10/02/23 2105    Hematocrit (repeated)  35.1 %  34.0 - 46.6 10/18/23 1153       33.5 % 34.0 - 46.6 10/02/23 2105    Platelets   184 10*3/mm3 140 - 450 10/18/23 1153       226 10*3/mm3 140 - 450 10/02/23 2105       244 10*3/mm3 140 - 450 05/31/23 1035    GCT  176 mg/dL 74 - 180 11/02/23 0904       145 mg/dL 65 - 139 10/18/23 1153    Antibody Screen (repeated)        Third Trimester syphilis screen (repeated)         GTT Fasting        GTT 1 Hr        GTT 2 Hr        GTT 3 Hr  62 mg/dL 74 - 140 11/02/23 1059    Group B Strep  Positive  Negative 12/27/23 1125              Other testing        Test Value Reference Range Date Time    Parvo IgG         CMV IgG                   Drug Screening       Test Value Reference Range Date Time    Amphetamine Screen        Barbiturate Screen        Benzodiazepine Screen        Methadone Screen        Phencyclidine Screen        Opiates Screen        THC Screen        Cocaine Screen        Propoxyphene Screen        Buprenorphine Screen        Methamphetamine Screen        Oxycodone Screen        Tricyclic Antidepressants Screen                  Legend    ^: Historical                          External Prenatal Results       Pregnancy Outside Results - Transcribed From Office Records - See Scanned Records For Details       Test Value Date Time    ABO  A  05/31/23 1035    Rh  Positive  05/31/23 1035    Antibody Screen  Negative  05/31/23 1035    Varicella IgG       Rubella  5.47 index 05/31/23 1035    Hgb  11.8 g/dL 10/18/23 1153       11.5 g/dL 10/02/23 2105       13.5 g/dL 05/31/23 1035    Hct  35.1 % 10/18/23 1153       33.5 % 10/02/23 2105       39.1 % 05/31/23 1035    Glucose Fasting GTT       Glucose Tolerance Test 1 hour       Glucose Tolerance Test 3 hour  62 mg/dL 11/02/23 1059    Gonorrhea (discrete)  Negative  05/31/23 1020    Chlamydia (discrete)  Negative  05/31/23 1020    RPR ^ Negative  01/22/21     VDRL ^ Nonreactive  11/22/20     Syphilis Antibody       HBsAg  Non-Reactive  05/31/23 1035    Herpes Simplex Virus  PCR       Herpes Simplex VIrus Culture       HIV  Non-Reactive  23 1035    Hep C RNA Quant PCR       Hep C Antibody  Non-Reactive  23 1035    AFP       Group B Strep  Positive  23 1125    GBS Susceptibility to Clindamycin       GBS Susceptibility to Erythromycin       Fetal Fibronectin       Genetic Testing, Maternal Blood                 Drug Screening       Test Value Date Time    Urine Drug Screen       Amphetamine Screen       Barbiturate Screen       Benzodiazepine Screen       Methadone Screen       Phencyclidine Screen       Opiates Screen       THC Screen       Cocaine Screen       Propoxyphene Screen       Buprenorphine Screen       Methamphetamine Screen       Oxycodone Screen       Tricyclic Antidepressants Screen                 Legend    ^: Historical                             Past OB History:     OB History    Para Term  AB Living   3 2 2 0 0 2   SAB IAB Ectopic Molar Multiple Live Births   0 0 0 0 0 2      # Outcome Date GA Lbr Jt/2nd Weight Sex Delivery Anes PTL Lv   3 Current            2 Term 21 39w1d 06:47 / 00:57 4190 g (9 lb 3.8 oz) F Vag-Spont EPI N HADLEY      Name: ALETHEA TRINIDAD      Apgar1: 8  Apgar5: 9   1 Term 20 37w0d  3600 g (7 lb 15 oz) F Vag-Spont  N HADLEY       Past Medical History: No past medical history on file.   Past Surgical History Past Surgical History:   Procedure Laterality Date    ULNAR NERVE REPAIR  2016    ULNAR NERVE TRANSPOSITION Right 2015    WISDOM TOOTH EXTRACTION  2015      Family History: Family History   Problem Relation Age of Onset    No Known Problems Father     No Known Problems Mother     Ovarian cancer Neg Hx     Uterine cancer Neg Hx     Breast cancer Neg Hx     Colon cancer Neg Hx     Prostate cancer Neg Hx     Clotting disorder Neg Hx     Pulmonary embolism Neg Hx     Deep vein thrombosis Neg Hx       Social History:  reports that she has never smoked. She has never used smokeless tobacco.    reports that she does not currently use alcohol.   reports no history of drug use.        General ROS: Pertinent items are noted in HPI  Home Medications:  Fluticasone-Salmeterol, Wheat Dextrin, montelukast, and prenatal vitamin 27-0.8    Allergies:  No Known Allergies    Objective       Vital Signs Range for the last 24 hours  Temperature:     Temp Source:     BP: BP: ()/(72-73) 105/73   Pulse: Heart Rate:  [95] 95   Respirations: Resp:  [18] 18   SPO2: SpO2:  [100 %] 100 %     Physical Examination:   General appearance - alert, well appearing, and in no distress  Mental status - alert, oriented to person, place, and time  Abdomen - soft, nontender, nondistended,   Pelvic - normal external genitalia, vulva, vagina, cervix, and uterus   Back exam - full range of motion, no tenderness or pain on motion  Neurological - alert, oriented, normal speech  Extremities - peripheral pulses normal  Skin - normal coloration and turgor, no suspicious skin lesions noted    Presentation: vtx   Cervix: Method: sterile exam per RN   Dilation: Cervical Dilation (cm): 4-5   Effacement: Cervical Effacement: 80%   Station:         Fetal Heart Rate Assessment :  130s, R, GLTV, no decels, cat 1  Method:     Beats/min:     Baseline:     Variability:     Accels:     Decels:     Tracing Category:       Uterine Assessment :  q 2-3 min  Method:     Frequency (min):     Ctx Count in 10 min:     Duration:     Intensity:     [place] Units:       GBS is positive     US 1/9/24 - 4072, 9#0oz, >99%, AC - 99%    Assessment & Plan       Normal labor  macrosomia    Assessment:  1.  Intrauterine pregnancy at 38w5d gestation with reactive fetal status.    2.  labor  without ROM  3.  Obstetrical history significant for is non-contributory.  4.  GBS status:   Group B Strep, DNA   Date Value Ref Range Status   12/27/2023 Positive (A) Negative Final       Plan:  1. expectant management, analgesia with  epidural, and antibiotic for GBS  2.  Plan of  care has been reviewed with patient and patient agrees.   3.  Risks, benefits of treatment plan have been discussed.  4.  All questions have been answered.        Electronically signed by Shital Watts MD, 01/17/24, 7:03 PM EST.

## 2024-01-18 NOTE — PROGRESS NOTES
" Lawson   Vaginal Delivery Progress Note    Subjective   Postpartum Day 1: Vaginal Delivery    The patient feels well.  Her pain is well controlled with nonsteroidal anti-inflammatory drugs.   She is ambulating well.  Patient describes her bleeding as moderate lochia.    Breastfeeding: infant latching.    Objective     Vital Signs Range for the last 24 hours  Temperature: Temp:  [97.5 °F (36.4 °C)-98.4 °F (36.9 °C)] 97.5 °F (36.4 °C)   Temp Source: Temp src: Oral   BP: BP: ()/(25-99) 108/61   Pulse: Heart Rate:  [] 61   Respirations: Resp:  [16-18] 16   SPO2: SpO2:  [98 %-100 %] 100 %   O2 Amount (l/min):     O2 Devices Device (Oxygen Therapy): room air   Weight: Weight:  [116 kg (256 lb)] 116 kg (256 lb)     Admit Height:  Height: 170.2 cm (67\")      Physical Exam:  General:  no acute distress.  Abdomen: Fundus: appropriate, firm, non tender  Extremities: normal, atraumatic, no cyanosis, and trace edema.       Lab results reviewed:  Yes   Rubella:  No results found for: \"RUBELLAIGGIN\" Nurse Transcribed from prenatal record --    Rubella Antibodies, IgG   Date Value Ref Range Status   05/31/2023 5.47 Immune >0.99 index Final     Comment:                                     Non-immune       <0.90                                  Equivocal  0.90 - 0.99                                  Immune           >0.99     Rh Status:    RH type   Date Value Ref Range Status   01/17/2024 Positive  Final     Immunizations:   Immunization History   Administered Date(s) Administered    DTaP 1997, 1997, 01/07/1998, 01/04/1999, 07/18/2001    HPV Quadrivalent 01/23/2009    Hep B, Adolescent or Pediatric 01/07/1998    Hep B, Unspecified 1997, 1997    Hepatitis A 06/21/2012, 01/21/2013    HiB 1997, 1997, 01/07/1998, 10/07/1998    Hpv9 05/27/2008, 07/24/2008    IPV 1997, 1997, 01/04/1999, 07/18/2001    Influenza LAIV (Nasal) 09/20/2013    MMR 10/07/1998, 07/18/2001    " Meningococcal B,(Bexsero) 07/12/2017    Meningococcal, Unspecified 07/24/2008, 01/19/2015    TD,ADSORBED,PRESERVATIVE FREE, ADULT USE, LF, UNSPECIFIED 05/23/2008    Tdap 07/12/2017, 07/29/2021    Varicella 07/18/2001, 06/05/2007       Assessment & Plan       Normal labor    Postpartum care and examination immediately after delivery    Macrosomia affecting management of mother      Vaginal, Spontaneous   .      Plan:  Continue current care.      Kristin King MD  1/18/2024  13:17 EST

## 2024-01-18 NOTE — PLAN OF CARE
Problem: Adult Inpatient Plan of Care  Goal: Plan of Care Review  Outcome: Ongoing, Progressing  Goal: Patient-Specific Goal (Individualized)  Outcome: Ongoing, Progressing  Goal: Absence of Hospital-Acquired Illness or Injury  Outcome: Ongoing, Progressing  Goal: Optimal Comfort and Wellbeing  Outcome: Ongoing, Progressing  Goal: Readiness for Transition of Care  Outcome: Ongoing, Progressing  Intervention: Mutually Develop Transition Plan  Recent Flowsheet Documentation  Taken 1/17/2024 1943 by Jayshree Shaw, RN  Equipment Currently Used at Home: none  Taken 1/17/2024 1941 by Jayshree Shaw, RN  Transportation Anticipated: family or friend will provide  Patient/Family Anticipated Services at Transition: none  Patient/Family Anticipates Transition to: home     Problem: Bleeding (Labor)  Goal: Hemostasis  Outcome: Ongoing, Progressing     Problem: Change in Fetal Wellbeing (Labor)  Goal: Stable Fetal Wellbeing  Outcome: Ongoing, Progressing     Problem: Delayed Labor Progression (Labor)  Goal: Effective Progression to Delivery  Outcome: Ongoing, Progressing     Problem: Infection (Labor)  Goal: Absence of Infection Signs and Symptoms  Outcome: Ongoing, Progressing     Problem: Labor Pain (Labor)  Goal: Acceptable Pain Control  Outcome: Ongoing, Progressing     Problem: Uterine Tachysystole (Labor)  Goal: Normal Uterine Contraction Pattern  Outcome: Ongoing, Progressing   Goal Outcome Evaluation:

## 2024-01-18 NOTE — L&D DELIVERY NOTE
[facility]   Vaginal Delivery Note    Patient Name: Solange Guzmán  : 1995  MRN: [id number]    Date of Delivery: 2024     Diagnosis     Pre & Post-Delivery:  Intrauterine pregnancy at 38w6d  Labor status: Spontaneous Onset of Labor     Normal labor             Problem List    Transfer to Postpartum     Review the Delivery Report for details.     Delivery     Delivery: Vaginal, Spontaneous     YOB: 2022    Time of Birth:  Gestational Age 11:57 PM   38w6d     Anesthesia: Epidural     Delivering clinician: Kristin King    Forceps?   No   Vacuum? No    Shoulder dystocia present: No        Delivery narrative: Presented in labor.  Progressed spontaneously.  Epidural analgesia and SROM.  Received IV antibiotics for GBS prophylaxis.   of a liveborn male infant nuchal cord x 1 reduced at the perineum.  The infant was bulb suction and laid on the mother's abdomen where he was attended to by the nursery personnel.  After approximately 1 minute the umbilical cord was clamped in 2 places and cut in between by the father.  Samples of blood were collected from the umbilical cord for arterial and venous blood gas sampling as well as Soraida testing.  There was spontaneous delivery of intact placenta with three-vessel cord and trailing membranes.  The uterus was explored with a sponge on a stick x 2 followed by uterine massage.  There was a second-degree perineal laceration.  It was repaired with 3-0 Monocryl in layers for good reapproximation and hemostasis.  The uterus was once again explored and fundus massaged.   cc.  The placenta will be discarded.  Mom and infant are in the LDR in satisfactory condition.  Dad is at the bedside.      Infant     Findings: male  infant     Infant observations: Weight: 4625 g (10 lb 3.1 oz)   Length: 22.5  in  Observations/Comments:        Apgars:   @ 1 minute /      @ 5 minutes   Infant Name: Gonzales     Placenta & Cord         Placenta  delivered  Spontaneous  at   1/18/2024 12:07 AM     Cord: 3 vessels  present.   Nuchal Cord?  no   Cord blood obtained: Yes    Cord gases obtained:  Yes    Cord gas results: Venous:    pH, Cord Venous   Date Value Ref Range Status   01/18/2024 7.335 7.310 - 7.370 pH Units Final     Base Excess, Cord Venous   Date Value Ref Range Status   01/18/2024 -2.8 (L) -2.0 - 2.0 mmol/L Final       Arterial:    pH, Cord Arterial   Date Value Ref Range Status   01/18/2024 7.10 (L) 7.21 - 7.31 pH Units Final     Base Exc, Cord Arterial   Date Value Ref Range Status   01/18/2024 -7.3 (L) -2.0 - 2.0 mmol/L Final        Repair     Episiotomy: None     No    Lacerations: Yes  Laceration Information  Laceration Repaired?   Perineal: 2nd  Yes    Periurethral:       Labial:       Sulcus:       Vaginal:       Cervical:         Suture used for repair: 3-0 Monocryl  Laceration Length for 3rd or 4th degree lacerations: Not applicable cm   Estimated Blood Loss: Est. Blood Loss (mL): 300 mL (Filed from Delivery Summary) (01/17/24 3495)     Quantitative Blood Loss:          Complications     none    Disposition     Mother to Remain in LD  in stable condition currently.  Baby to remains with mom  in stable condition currently.    Kristin King MD  01/18/24  00:32 EST

## 2024-01-18 NOTE — NON STRESS TEST
Obstetrical Non-stress Test Interpretation     Name:  Solange Guzmán  MRN: [id number]    28 y.o. female  at 38w5d    Indication: EGA 38 weeks 5 days; contractions and spotting;:       Baseline: Normal 110-160 bpm  Variability:   Moderate/Normal (amplitude 6-25 bpm)  Accelerations: Present (32 weeks+) 15 x 15 bpm  Decelerations: Absent   Contractions:  Present       Impression:  Category I       Kristin King MD  2024  19:40 EST

## 2024-01-19 VITALS
TEMPERATURE: 97.3 F | OXYGEN SATURATION: 99 % | HEIGHT: 67 IN | HEART RATE: 71 BPM | RESPIRATION RATE: 17 BRPM | DIASTOLIC BLOOD PRESSURE: 69 MMHG | SYSTOLIC BLOOD PRESSURE: 111 MMHG | BODY MASS INDEX: 40.18 KG/M2 | WEIGHT: 256 LBS

## 2024-01-19 PROCEDURE — 90715 TDAP VACCINE 7 YRS/> IM: CPT | Performed by: OBSTETRICS & GYNECOLOGY

## 2024-01-19 PROCEDURE — 94799 UNLISTED PULMONARY SVC/PX: CPT

## 2024-01-19 PROCEDURE — 90471 IMMUNIZATION ADMIN: CPT | Performed by: OBSTETRICS & GYNECOLOGY

## 2024-01-19 PROCEDURE — 25010000002 TETANUS-DIPHTH-ACELL PERTUSSIS 5-2.5-18.5 LF-MCG/0.5 SUSPENSION PREFILLED SYRINGE: Performed by: OBSTETRICS & GYNECOLOGY

## 2024-01-19 RX ORDER — ACETAMINOPHEN 325 MG/1
650 TABLET ORAL EVERY 6 HOURS PRN
Qty: 20 TABLET | Refills: 0 | Status: SHIPPED | OUTPATIENT
Start: 2024-01-19

## 2024-01-19 RX ORDER — IBUPROFEN 600 MG/1
600 TABLET ORAL EVERY 6 HOURS PRN
Qty: 30 TABLET | Refills: 0 | Status: SHIPPED | OUTPATIENT
Start: 2024-01-19

## 2024-01-19 RX ADMIN — IBUPROFEN 600 MG: 600 TABLET, FILM COATED ORAL at 15:25

## 2024-01-19 RX ADMIN — DOCUSATE SODIUM 100 MG: 100 CAPSULE, LIQUID FILLED ORAL at 09:38

## 2024-01-19 RX ADMIN — BUDESONIDE AND FORMOTEROL FUMARATE DIHYDRATE 2 PUFF: 160; 4.5 AEROSOL RESPIRATORY (INHALATION) at 10:44

## 2024-01-19 RX ADMIN — FERROUS SULFATE TAB 325 MG (65 MG ELEMENTAL FE) 325 MG: 325 (65 FE) TAB at 08:28

## 2024-01-19 RX ADMIN — ACETAMINOPHEN 650 MG: 325 TABLET ORAL at 04:30

## 2024-01-19 RX ADMIN — TETANUS TOXOID, REDUCED DIPHTHERIA TOXOID AND ACELLULAR PERTUSSIS VACCINE, ADSORBED 0.5 ML: 5; 2.5; 8; 8; 2.5 SUSPENSION INTRAMUSCULAR at 15:26

## 2024-01-19 RX ADMIN — IBUPROFEN 600 MG: 600 TABLET, FILM COATED ORAL at 03:14

## 2024-01-19 NOTE — LACTATION NOTE
LC in to follow up with breastfeeding progress. Patient continues to exclusively breastfeed. Infant's weight loss and output are wdl. She states she did have a sudden decrease in her supply around 6 months with her other child. No obvious indications for the decrease. LC encouraged her to remind the lactation consultant at her pedi office. Patient is planning on discharge today. LC discussed normal infant output patterns to expect and unlimited time/access to the breast but if infant is not waking by 3 hours to wake and feed using measures shown in the hospital. LC discussed checking to make sure new medications are safe to breastfeed. LC discussed alcohol use and cigarette/second hand smoke around baby and breastfeeding and discussed the impact of street drugs on infants and breastfeeding. LC used the page in the breastfeeding guide to discuss harmful effects of these. Breastfeeding/Lactation expectations and anticipatory guidance discussed for the next two weeks . LC discussed nipple care, plugged ducts, engorgement, and breast infection. LC encouraged mom to see pediatrician two days from discharge for follow up. Patient has a breastpump for home use and LC discussed good pumping guidelines and normal expectations with pumping and storage and preparation of ebm for feedings. LC discussed breastfeeding/lactation resources including the local breastfeeding support group after discharge and when to call the doctor. Patient showed good understanding.

## 2024-01-19 NOTE — DISCHARGE SUMMARY
"ANUJA Lawson  Delivery Discharge Summary    Primary OB Clinician:     EDC: Estimated Date of Delivery: 24    Admitting Diagnosis:  Normal labor [O80, Z37.9]    Discharge Diagnosis:  Same as Admitting plus:   Pregnancy at 38w5d - Delivered     Antepartum complications: none    Date of Delivery: 2024   Time of Delivery: 11:57 PM     Delivered By:  Kristin King     Delivery Type: Vaginal, Spontaneous      Tubal Ligation: n/a    Baby:male  infant;   Apgar:  8  @ 1 minute /   Apgar:  8  @ 5 minutes   Weight: 4625 g (10 lb 3.1 oz)    Length: 22.5     Anesthesia: Epidural      Intrapartum complications: None    Laceration: Yes  Laceration Information  Laceration Repaired?   Perineal: 2nd  Yes    Periurethral:       Labial:       Sulcus:       Vaginal:       Cervical:         Suture used for repair: 3-0 Monocryl      Episiotomy: No    Placenta: Spontaneous     Feeding method: Breastfeeding Status: Yes    Rh Immune globulin given: not applicable    Rubella vaccine given: not applicable  ANUJA Lawson  Vaginal Delivery Progress Note    Subjective   Postpartum Day 2: Vaginal Delivery    The patient feels well.  Her pain is well controlled with nonsteroidal anti-inflammatory drugs and Tylenol.   She is ambulating well.  Patient describes her bleeding as moderate lochia.    Breastfeeding: infant latching.    Objective     Vital Signs Range for the last 24 hours  Temperature: Temp:  [97.3 °F (36.3 °C)-97.9 °F (36.6 °C)] 97.3 °F (36.3 °C)   Temp Source: Temp src: Oral   BP: BP: ()/(58-71) 97/58   Pulse: Heart Rate:  [57-88] 57   Respirations: Resp:  [16-18] 16   SPO2: SpO2:  [99 %] 99 %   O2 Amount (l/min):     O2 Devices Device (Oxygen Therapy): room air   Weight:       Admit Height:  Height: 170.2 cm (67\")      Physical Exam:  General:  no acute distress.  Abdomen: Fundus: appropriate, firm, non tender  Extremities: normal, atraumatic, no cyanosis, and trace edema.       Lab results reviewed:  Yes "   Rubella:  Rubella Antibodies, IgG   Date Value Ref Range Status   05/31/2023 5.47 Immune >0.99 index Final     Comment:                                     Non-immune       <0.90                                  Equivocal  0.90 - 0.99                                  Immune           >0.99     Rh Status:    RH type   Date Value Ref Range Status   01/17/2024 Positive  Final     Immunizations:   Immunization History   Administered Date(s) Administered    DTaP 1997, 1997, 01/07/1998, 01/04/1999, 07/18/2001    HPV Quadrivalent 01/23/2009    Hep B, Adolescent or Pediatric 01/07/1998    Hep B, Unspecified 1997, 1997    Hepatitis A 06/21/2012, 01/21/2013    HiB 1997, 1997, 01/07/1998, 10/07/1998    Hpv9 05/27/2008, 07/24/2008    IPV 1997, 1997, 01/04/1999, 07/18/2001    Influenza LAIV (Nasal) 09/20/2013    MMR 10/07/1998, 07/18/2001    Meningococcal B,(Bexsero) 07/12/2017    Meningococcal, Unspecified 07/24/2008, 01/19/2015    TD,ADSORBED,PRESERVATIVE FREE, ADULT USE, LF, UNSPECIFIED 05/23/2008    Tdap 07/12/2017, 07/29/2021    Varicella 07/18/2001, 06/05/2007       Hospital course:  Presented in labor.  Had a spontaneous vaginal delivery without complication.  She has met the expected postpartum milestones.  She is tolerating p.o. foods and fluids, voiding and reporting adequate pain management.  She has been discharged home in satisfactory condition.  She will maintain pelvic rest.  Call with excessive bleeding, infection or depression.    Discharge Date: 1/19/2024; Discharge Time: 09:00 EST        Plan:      Follow-up appointment with Dr. Rahman 5-6 weeks.     Electronically signed by Kristin King MD, 01/19/24, 9:00 AM EST.

## 2024-01-19 NOTE — PLAN OF CARE
Problem: Adult Inpatient Plan of Care  Goal: Plan of Care Review  Outcome: Ongoing, Progressing  Flowsheets (Taken 1/19/2024 0611)  Plan of Care Reviewed With: patient  Goal: Patient-Specific Goal (Individualized)  Outcome: Ongoing, Progressing  Goal: Absence of Hospital-Acquired Illness or Injury  Outcome: Ongoing, Progressing  Intervention: Identify and Manage Fall Risk  Recent Flowsheet Documentation  Taken 1/19/2024 0525 by Lindy Titus RN  Safety Promotion/Fall Prevention: safety round/check completed  Taken 1/19/2024 0430 by Lindy Titus RN  Safety Promotion/Fall Prevention: safety round/check completed  Taken 1/19/2024 0315 by Lindy Titus RN  Safety Promotion/Fall Prevention: safety round/check completed  Taken 1/19/2024 0210 by Lindy Titus RN  Safety Promotion/Fall Prevention: safety round/check completed  Taken 1/18/2024 2355 by Lindy Titus RN  Safety Promotion/Fall Prevention: safety round/check completed  Taken 1/18/2024 2133 by Lindy Titus RN  Safety Promotion/Fall Prevention: safety round/check completed  Taken 1/18/2024 2000 by Lindy Titus RN  Safety Promotion/Fall Prevention:   safety round/check completed   nonskid shoes/slippers when out of bed   lighting adjusted   assistive device/personal items within reach   clutter free environment maintained  Taken 1/18/2024 1915 by Lindy Titus RN  Safety Promotion/Fall Prevention: safety round/check completed  Intervention: Prevent and Manage VTE (Venous Thromboembolism) Risk  Recent Flowsheet Documentation  Taken 1/18/2024 2000 by Lindy Titus RN  Activity Management: up ad margoth  VTE Prevention/Management: (encouraged to wear while in bed)   bilateral   compression stockings off  Intervention: Prevent Infection  Recent Flowsheet Documentation  Taken 1/18/2024 2000 by Lindy Titus RN  Infection Prevention:   single patient room provided   hand hygiene promoted  Goal: Optimal Comfort and Wellbeing  Outcome: Ongoing,  Progressing  Intervention: Monitor Pain and Promote Comfort  Recent Flowsheet Documentation  Taken 1/19/2024 0430 by Lindy Titus RN  Pain Management Interventions: see MAR  Taken 1/19/2024 0315 by Lindy Titus RN  Pain Management Interventions: (sitz bath encouraged) see MAR  Taken 1/18/2024 2130 by Lindy Titus RN  Pain Management Interventions: see MAR  Taken 1/18/2024 2000 by Lindy Titus RN  Pain Management Interventions: pain management plan reviewed with patient/caregiver  Intervention: Provide Person-Centered Care  Recent Flowsheet Documentation  Taken 1/18/2024 2000 by Lindy Titus RN  Trust Relationship/Rapport:   care explained   choices provided   questions encouraged   thoughts/feelings acknowledged  Goal: Readiness for Transition of Care  Outcome: Ongoing, Progressing     Problem: Adjustment to Role Transition (Postpartum Vaginal Delivery)  Goal: Successful Maternal Role Transition  Outcome: Ongoing, Progressing     Problem: Bleeding (Postpartum Vaginal Delivery)  Goal: Hemostasis  Outcome: Ongoing, Progressing     Problem: Infection (Postpartum Vaginal Delivery)  Goal: Absence of Infection Signs/Symptoms  Outcome: Ongoing, Progressing  Intervention: Prevent or Manage Infection  Recent Flowsheet Documentation  Taken 1/19/2024 0340 by Lindy Titus RN  Perineal Care: sitz bath taken  Taken 1/18/2024 2020 by Lindy Titus RN  Perineal Care: sitz bath taken     Problem: Pain (Postpartum Vaginal Delivery)  Goal: Acceptable Pain Control  Outcome: Ongoing, Progressing  Intervention: Prevent or Manage Pain  Recent Flowsheet Documentation  Taken 1/19/2024 0430 by Lindy Titus RN  Pain Management Interventions: see MAR  Taken 1/19/2024 0315 by Lindy Titus RN  Pain Management Interventions: (sitz bath encouraged) see MAR  Taken 1/18/2024 2130 by Lindy Titus RN  Pain Management Interventions: see MAR  Taken 1/18/2024 2000 by Lindy Titus RN  Pain Management Interventions: pain management  plan reviewed with patient/caregiver     Problem: Urinary Retention (Postpartum Vaginal Delivery)  Goal: Effective Urinary Elimination  Outcome: Ongoing, Progressing     Problem: Breastfeeding  Goal: Effective Breastfeeding  Outcome: Ongoing, Progressing   Goal Outcome Evaluation:  Plan of Care Reviewed With: patient      VS stable. Good pain management. Obstetrically stable, proper use of sitz bath. Effective breastfeeding. Independent ADL's. Good infant parent interaction.

## 2024-01-19 NOTE — PLAN OF CARE
Problem: Adult Inpatient Plan of Care  Goal: Plan of Care Review  Outcome: Met  Goal: Patient-Specific Goal (Individualized)  Outcome: Met  Goal: Absence of Hospital-Acquired Illness or Injury  Outcome: Met  Intervention: Identify and Manage Fall Risk  Recent Flowsheet Documentation  Taken 1/19/2024 1200 by Rin Rockwell RN  Safety Promotion/Fall Prevention: safety round/check completed  Taken 1/19/2024 1100 by Rin Rockwell RN  Safety Promotion/Fall Prevention: safety round/check completed  Taken 1/19/2024 1000 by Rin Rockwell RN  Safety Promotion/Fall Prevention: safety round/check completed  Taken 1/19/2024 0830 by Rin Rockwell RN  Safety Promotion/Fall Prevention: safety round/check completed  Intervention: Prevent and Manage VTE (Venous Thromboembolism) Risk  Recent Flowsheet Documentation  Taken 1/19/2024 0830 by Rin Rockwell RN  Activity Management: up ad margoth  Goal: Optimal Comfort and Wellbeing  Outcome: Met  Goal: Readiness for Transition of Care  Outcome: Met     Problem: Adjustment to Role Transition (Postpartum Vaginal Delivery)  Goal: Successful Maternal Role Transition  Outcome: Met     Problem: Bleeding (Postpartum Vaginal Delivery)  Goal: Hemostasis  Outcome: Met     Problem: Infection (Postpartum Vaginal Delivery)  Goal: Absence of Infection Signs/Symptoms  Outcome: Met     Problem: Pain (Postpartum Vaginal Delivery)  Goal: Acceptable Pain Control  Outcome: Met     Problem: Urinary Retention (Postpartum Vaginal Delivery)  Goal: Effective Urinary Elimination  Outcome: Met     Problem: Breastfeeding  Goal: Effective Breastfeeding  Outcome: Met     Problem: Asthma Comorbidity  Goal: Maintenance of Asthma Control  Outcome: Met   Goal Outcome Evaluation:

## 2024-01-26 ENCOUNTER — TELEPHONE (OUTPATIENT)
Dept: LACTATION | Facility: HOSPITAL | Age: 29
End: 2024-01-26
Payer: COMMERCIAL

## 2024-03-05 ENCOUNTER — POSTPARTUM VISIT (OUTPATIENT)
Dept: OBSTETRICS AND GYNECOLOGY | Facility: CLINIC | Age: 29
End: 2024-03-05
Payer: COMMERCIAL

## 2024-03-05 VITALS
WEIGHT: 240 LBS | HEART RATE: 71 BPM | SYSTOLIC BLOOD PRESSURE: 119 MMHG | HEIGHT: 67 IN | DIASTOLIC BLOOD PRESSURE: 76 MMHG | BODY MASS INDEX: 37.67 KG/M2

## 2024-03-05 PROBLEM — O99.210 OBESITY AFFECTING PREGNANCY, ANTEPARTUM: Status: RESOLVED | Noted: 2023-10-18 | Resolved: 2024-03-05

## 2024-03-05 PROBLEM — O09.293 HISTORY OF MACROSOMIA IN INFANT IN PRIOR PREGNANCY, CURRENTLY PREGNANT IN THIRD TRIMESTER: Status: RESOLVED | Noted: 2023-11-15 | Resolved: 2024-03-05

## 2024-03-05 PROBLEM — O36.60X0 MACROSOMIA AFFECTING MANAGEMENT OF MOTHER: Status: RESOLVED | Noted: 2024-01-18 | Resolved: 2024-03-05

## 2024-03-05 PROBLEM — Z37.9 NORMAL LABOR: Status: RESOLVED | Noted: 2024-01-17 | Resolved: 2024-03-05

## 2024-03-05 PROBLEM — J45.909 ASTHMA AFFECTING PREGNANCY, ANTEPARTUM: Status: RESOLVED | Noted: 2023-11-29 | Resolved: 2024-03-05

## 2024-03-05 PROBLEM — Z34.80 SUPERVISION OF OTHER NORMAL PREGNANCY, ANTEPARTUM: Status: RESOLVED | Noted: 2023-10-18 | Resolved: 2024-03-05

## 2024-03-05 PROBLEM — O99.519 ASTHMA AFFECTING PREGNANCY, ANTEPARTUM: Status: RESOLVED | Noted: 2023-11-29 | Resolved: 2024-03-05

## 2024-03-05 PROCEDURE — 99213 OFFICE O/P EST LOW 20 MIN: CPT | Performed by: OBSTETRICS & GYNECOLOGY

## 2024-03-05 NOTE — ASSESSMENT & PLAN NOTE
Stable postpartum course  Continue prenatal vitamin while breast-feeding  Recommended condom use to prevent pregnancy.  Recommended avoiding pregnancy for at least 9 months postdelivery.

## 2024-03-05 NOTE — PROGRESS NOTES
"POSTPARTUM Follow Up Visit    CC:  Postpartum     HPI:      Antepartum or Postpartum complications: Elevated BMI- pre-pregnancy, LGA  Delivery type:    Perineum : 2nd degree laceration  Feeding: Breast     Pain:  No  Vaginal Bleeding:  No  EPDS score: 0  Plans for BC:  Condoms  Last PAP:   Last Completed Pap Smear            PAP SMEAR (Yearly) Next due on 2023  Done    2021  IGP,rfx Aptima HPV All Pth    2020  SCANNED - PAP SMEAR                    /76   Pulse 71   Ht 170.2 cm (67\")   Wt 109 kg (240 lb)   LMP 2023   Breastfeeding Yes   BMI 37.59 kg/m²     Physical Exam  Vitals and nursing note reviewed. Exam conducted with a chaperone present.   Constitutional:       General: She is not in acute distress.     Appearance: Normal appearance. She is not ill-appearing.   Musculoskeletal:         General: No swelling.      Right lower leg: No edema.      Left lower leg: No edema.   Skin:     General: Skin is warm and dry.      Findings: No rash.   Neurological:      Mental Status: She is alert and oriented to person, place, and time.   Psychiatric:         Mood and Affect: Mood normal.         Behavior: Behavior normal.         Thought Content: Thought content normal.         Judgment: Judgment normal.           ASSESSMENT AND PLAN:  Diagnoses and all orders for this visit:    1. Encounter for postpartum visit (Primary)  Assessment & Plan:  Stable postpartum course  Continue prenatal vitamin while breast-feeding  Recommended condom use to prevent pregnancy.  Recommended avoiding pregnancy for at least 9 months postdelivery.            Counseling:  All birth control options reviewed in detail.  R/B/A/SE/E of each wrt pts PMHx and prior BC use  DILEEP risk w hormonal BIRTH CONTROL reviewed (estrogen containing only), S/Sx to watch for discussed and questions answered.  Newer studies indicate possible increased breast cancer reviewed (both estrogen and progestin " Quality 337: Tuberculosis Prevention For Psoriasis And Psoriatic Arthritis Patients On A Biological Immune Response Modifier: Patient has a documented negative annual TB screening. Detail Level: Detailed only).  May resume intercourse  May resume normal activities  Core strengthening exercises reviewed and recommended  Daly exercises reviewed and recommended  Ok to return to work/school    Follow Up:  Return for Annual physical.      Dylan Deleon MD  03/05/2024           Detail Level: Zone

## 2024-06-04 ENCOUNTER — OFFICE VISIT (OUTPATIENT)
Dept: OBSTETRICS AND GYNECOLOGY | Facility: CLINIC | Age: 29
End: 2024-06-04
Payer: COMMERCIAL

## 2024-06-04 VITALS
HEIGHT: 67 IN | HEART RATE: 79 BPM | WEIGHT: 239 LBS | BODY MASS INDEX: 37.51 KG/M2 | SYSTOLIC BLOOD PRESSURE: 112 MMHG | DIASTOLIC BLOOD PRESSURE: 78 MMHG

## 2024-06-04 DIAGNOSIS — Z01.419 WOMEN'S ANNUAL ROUTINE GYNECOLOGICAL EXAMINATION: Primary | ICD-10-CM

## 2024-06-04 PROCEDURE — 99395 PREV VISIT EST AGE 18-39: CPT | Performed by: OBSTETRICS & GYNECOLOGY

## 2024-06-04 PROCEDURE — G0123 SCREEN CERV/VAG THIN LAYER: HCPCS | Performed by: OBSTETRICS & GYNECOLOGY

## 2024-06-04 NOTE — ASSESSMENT & PLAN NOTE
Pap  Continue prenatal vitamin while breast-feeding and if not preventing pregnancy.  Recommended at least a year between pregnancies.  The patient declines contraception today.

## 2024-06-04 NOTE — PROGRESS NOTES
"Well Woman Visit    CC: Well woman visit    Subjective:   28 y.o. who presents for a well woman exam. No problems today. No menses since delivery, but still breastfeeding.    Last PAP:   Last Completed Pap Smear       This patient has no relevant Health Maintenance data.          Last MMG:   Last Completed Mammogram       This patient has no relevant Health Maintenance data.             History: PMHx, Meds, Allergies, PSHx, Social Hx, and POBHx all reviewed and updated.    /78   Pulse 79   Ht 170.2 cm (67\")   Wt 108 kg (239 lb)   Breastfeeding Yes   BMI 37.43 kg/m²     Physical Exam  Vitals and nursing note reviewed. Exam conducted with a chaperone present.   Constitutional:       General: She is not in acute distress.     Appearance: Normal appearance. She is not ill-appearing.   HENT:      Head: Normocephalic and atraumatic.      Mouth/Throat:      Mouth: Mucous membranes are moist.      Pharynx: Oropharynx is clear.   Eyes:      Extraocular Movements: Extraocular movements intact.   Neck:      Thyroid: No thyroid mass or thyromegaly.   Chest:   Breasts:     Breasts are symmetrical.      Right: Nipple discharge present. No swelling, bleeding, inverted nipple, mass, skin change or tenderness.      Left: Nipple discharge present. No swelling, bleeding, inverted nipple, mass, skin change or tenderness.      Comments: Patient is currently breast-feeding  Abdominal:      General: There is no distension.      Palpations: Abdomen is soft. There is no mass.      Tenderness: There is no abdominal tenderness.      Hernia: There is no hernia in the left inguinal area or right inguinal area.   Genitourinary:     General: Normal vulva.      Exam position: Lithotomy position.      Pubic Area: No rash.       Labia:         Right: No rash, tenderness, lesion or injury.         Left: No rash, tenderness, lesion or injury.       Urethra: No prolapse, urethral pain or urethral lesion.      Vagina: Normal. No " vaginal discharge, erythema, tenderness, bleeding or prolapsed vaginal walls.      Cervix: Normal. No friability, lesion, erythema or cervical bleeding.      Uterus: Normal. Not enlarged, not fixed, not tender and no uterine prolapse.       Adnexa:         Right: No mass or tenderness.          Left: No mass or tenderness.     Musculoskeletal:         General: No swelling.      Right lower leg: No edema.      Left lower leg: No edema.   Lymphadenopathy:      Upper Body:      Right upper body: No supraclavicular or axillary adenopathy.      Left upper body: No supraclavicular or axillary adenopathy.   Skin:     General: Skin is warm and dry.      Findings: No rash.   Neurological:      Mental Status: She is alert and oriented to person, place, and time.   Psychiatric:         Mood and Affect: Mood normal.         Behavior: Behavior normal.         Thought Content: Thought content normal.         Assessment and Plan:  Diagnoses and all orders for this visit:    1. Women's annual routine gynecological examination (Primary)  Assessment & Plan:  Pap  Continue prenatal vitamin while breast-feeding and if not preventing pregnancy.  Recommended at least a year between pregnancies.  The patient declines contraception today.    Orders:  -     IGP,rfx Aptima HPV All Pth        Counseling:     All BC Options R/B/A/SE/E of each reviewed  OCP/Hormone use risk DILEEP  Track menses, RTO IF <q21d, >7d long, or heavy    Preventative:   Recommend FLU vaccine this season, R/B discussed  Recommend COVID vaccine, R/B discussed    She understands the importance of having any ordered tests to be performed in a timely fashion.  The risks of not performing them include, but are not limited to, advanced cancer stages, bone loss from osteoporosis and/or subsequent increase in morbidity and/or mortality.  She is encouraged to review her results online and/or contact or office if she has questions.     Follow Up:  Return for Annual  physical.    Dylan Deleon MD  06/04/2024

## 2024-06-06 LAB
CONV .: NORMAL
CYTOLOGIST CVX/VAG CYTO: NORMAL
CYTOLOGY CVX/VAG DOC CYTO: NORMAL
CYTOLOGY CVX/VAG DOC THIN PREP: NORMAL
DX ICD CODE: NORMAL
Lab: NORMAL
OTHER STN SPEC: NORMAL
STAT OF ADQ CVX/VAG CYTO-IMP: NORMAL

## 2025-02-18 ENCOUNTER — OFFICE VISIT (OUTPATIENT)
Dept: FAMILY MEDICINE CLINIC | Facility: CLINIC | Age: 30
End: 2025-02-18
Payer: COMMERCIAL

## 2025-02-18 VITALS
TEMPERATURE: 97.9 F | OXYGEN SATURATION: 98 % | BODY MASS INDEX: 38.55 KG/M2 | HEIGHT: 67 IN | WEIGHT: 245.6 LBS | DIASTOLIC BLOOD PRESSURE: 74 MMHG | RESPIRATION RATE: 20 BRPM | SYSTOLIC BLOOD PRESSURE: 128 MMHG | HEART RATE: 70 BPM

## 2025-02-18 DIAGNOSIS — Z00.00 ANNUAL PHYSICAL EXAM: ICD-10-CM

## 2025-02-18 DIAGNOSIS — N92.6 IRREGULAR MENSES: ICD-10-CM

## 2025-02-18 DIAGNOSIS — N76.0 ACUTE VAGINITIS: ICD-10-CM

## 2025-02-18 DIAGNOSIS — Z76.89 ESTABLISHING CARE WITH NEW DOCTOR, ENCOUNTER FOR: Primary | ICD-10-CM

## 2025-02-18 NOTE — PROGRESS NOTES
Solange Guzmán presents to [facility] with complaints of irregular menstrual cycle, and is here to establish as a new patient.      History of Present Illness  This is a 29-year-old female, no significant past medical history, who presents to clinic with complaints of irregular menstrual cycle and is here to establish as a new patient.    Patient states overall she is very healthy, states that she has never been diagnosed with anything major, she has had 3 successful pregnancies that have all went really well, but states that the only concern that she has is her menstrual cycle.  States that her menstrual cycle has been very different since having her son, and is just been different for her.  Prior to having children, her menstrual cycle is always regular, not overtly heavy, not overtly painful, after she had her first daughter states that she did not have her menstrual cycle originally and then ended up actually not having 1 because she had her second daughter pretty quickly.  After her second daughter states that it was a little bit different but nothing severe and states that she did breast-feed with both of the her girls.  When she had her son though, states that she did breast-feed with him for about 7 or 8 months and then she did not start her menstrual cycle for about 2 to 3 months after that.  Ever since then it has been quite irregular and just different.  Patient states that she will go 1 month, then she will be about 13 days late, similar things happen after that.  She has taken pregnancy test multiple times throughout this and all have been negative.  The patient states that over the past few weeks she is even noticed that she is spotting infrequently as well.  Of note, patient did have quite a few UTIs with her last pregnancy including also a significant right upper respiratory infection where she received antibiotics and steroids routinely.  States that this is  "just different for her and wanted to start here.  Does not admit to really any other vaginal symptoms.  No pain with intercourse, no vaginal discharge or irritation, just that her menstrual cycles are different.  Has not followed up with OB/GYN.      Patient has no other acute complaints today.  Did discuss possibility of blood work but patient would like to hold off for now.  Refuses vaccine/immunizations but otherwise up-to-date.    The following portions of the patient's history were personally reviewed and updated as appropriate: allergies, current medications, past medical history, past surgical history, past family history, and past social history.       Objective   Vital Signs:   /74   Pulse 70   Temp 97.9 °F (36.6 °C)   Resp 20   Ht 170.2 cm (67\")   Wt 111 kg (245 lb 9.6 oz)   SpO2 98%   BMI 38.47 kg/m²     Body mass index is 38.47 kg/m².    All labs, imaging, test results, and specialty provider notes reviewed with patient.     Physical Exam  Vitals reviewed.   Constitutional:       Appearance: Normal appearance.   Cardiovascular:      Rate and Rhythm: Normal rate and regular rhythm.      Pulses: Normal pulses.      Heart sounds: Normal heart sounds.   Pulmonary:      Effort: Pulmonary effort is normal.      Breath sounds: Normal breath sounds.   Neurological:      General: No focal deficit present.      Mental Status: She is alert and oriented to person, place, and time.                  Class 2 Severe Obesity (BMI >=35 and <=39.9). Obesity-related health conditions include the following: none. Obesity is improving with lifestyle modifications. BMI is is above average; BMI management plan is completed. We discussed portion control and increasing exercise.            Assessment and Plan:  Diagnoses and all orders for this visit:    1. Establishing care with new doctor, encounter for (Primary)    2. Irregular menses    3. Acute vaginitis  -     Gardnerella vaginalis, Trichomonas vaginalis, " Candida albicans, DNA - Swab, Vagina; Future    4. Annual physical exam      Do question whether patient's symptoms may be related to underlying vaginal infection that could be contributing to altering her menstrual cycle.  We will evaluate this with a vaginal swab, treat accordingly based off results, if still recurs as a issue, may consider referral back to OB/GYN for further evaluation at that time.    Anticipatory Guidelines discussed with patient.    Discussed getting adequate exercise, reduced TV/electronic time, car safety including seat belt use, sexual activity (if applicable), and smoking/alcohol use (if applicable).    Follow Up:  No follow-ups on file.    Patient was given instructions and counseling regarding her condition or for health maintenance advice. Please see specific information pulled into the AVS if appropriate.

## 2025-02-21 ENCOUNTER — DOCUMENTATION (OUTPATIENT)
Dept: FAMILY MEDICINE CLINIC | Facility: CLINIC | Age: 30
End: 2025-02-21
Payer: COMMERCIAL

## 2025-02-27 ENCOUNTER — PATIENT ROUNDING (BHMG ONLY) (OUTPATIENT)
Dept: FAMILY MEDICINE CLINIC | Facility: CLINIC | Age: 30
End: 2025-02-27
Payer: COMMERCIAL

## 2025-02-27 NOTE — PROGRESS NOTES
My name is Chandni Ray, Practice Manager of [facility].    I want to officially welcome you to our practice and ask about your recent visit.     Please share with me what went well with your recent visit.     We are always looking for ways to make each experience better.  Do you have any suggestions on ways we can improve?     Overall were you satisfied with your visit?      Your experience is very important to us.  You may receive an email regarding a survey.  Please take a moment to complete.  This will help us to improve.    I appreciate you taking the time to answer these questions.     Thank you and have a Great day.     Chandni

## 2025-03-06 RX ORDER — AMOXICILLIN 500 MG/1
1000 CAPSULE ORAL 2 TIMES DAILY
Qty: 28 CAPSULE | Refills: 0 | Status: SHIPPED | OUTPATIENT
Start: 2025-03-06

## 2025-04-16 ENCOUNTER — LAB (OUTPATIENT)
Dept: LAB | Facility: HOSPITAL | Age: 30
End: 2025-04-16
Payer: COMMERCIAL

## 2025-04-16 DIAGNOSIS — N92.6 IRREGULAR MENSES: Primary | ICD-10-CM

## 2025-04-16 DIAGNOSIS — Z00.00 ANNUAL PHYSICAL EXAM: ICD-10-CM

## 2025-04-16 DIAGNOSIS — N91.2 AMENORRHEA: ICD-10-CM

## 2025-04-16 LAB
ALBUMIN SERPL-MCNC: 4.4 G/DL (ref 3.5–5.2)
ALBUMIN/GLOB SERPL: 1.5 G/DL
ALP SERPL-CCNC: 75 U/L (ref 39–117)
ALT SERPL W P-5'-P-CCNC: 11 U/L (ref 1–33)
ANION GAP SERPL CALCULATED.3IONS-SCNC: 9.7 MMOL/L (ref 5–15)
AST SERPL-CCNC: 15 U/L (ref 1–32)
BASOPHILS # BLD AUTO: 0.03 10*3/MM3 (ref 0–0.2)
BASOPHILS NFR BLD AUTO: 0.5 % (ref 0–1.5)
BILIRUB SERPL-MCNC: 0.2 MG/DL (ref 0–1.2)
BUN SERPL-MCNC: 13 MG/DL (ref 6–20)
BUN/CREAT SERPL: 17.8 (ref 7–25)
CALCIUM SPEC-SCNC: 10 MG/DL (ref 8.6–10.5)
CHLORIDE SERPL-SCNC: 104 MMOL/L (ref 98–107)
CO2 SERPL-SCNC: 25.3 MMOL/L (ref 22–29)
CREAT SERPL-MCNC: 0.73 MG/DL (ref 0.57–1)
DEPRECATED RDW RBC AUTO: 39.4 FL (ref 37–54)
EGFRCR SERPLBLD CKD-EPI 2021: 115.8 ML/MIN/1.73
EOSINOPHIL # BLD AUTO: 0.17 10*3/MM3 (ref 0–0.4)
EOSINOPHIL NFR BLD AUTO: 2.9 % (ref 0.3–6.2)
ERYTHROCYTE [DISTWIDTH] IN BLOOD BY AUTOMATED COUNT: 12.8 % (ref 12.3–15.4)
GLOBULIN UR ELPH-MCNC: 2.9 GM/DL
GLUCOSE SERPL-MCNC: 90 MG/DL (ref 65–99)
HCT VFR BLD AUTO: 40.7 % (ref 34–46.6)
HGB BLD-MCNC: 13.7 G/DL (ref 12–15.9)
IMM GRANULOCYTES # BLD AUTO: 0.01 10*3/MM3 (ref 0–0.05)
IMM GRANULOCYTES NFR BLD AUTO: 0.2 % (ref 0–0.5)
LYMPHOCYTES # BLD AUTO: 1.7 10*3/MM3 (ref 0.7–3.1)
LYMPHOCYTES NFR BLD AUTO: 28.6 % (ref 19.6–45.3)
MCH RBC QN AUTO: 28.7 PG (ref 26.6–33)
MCHC RBC AUTO-ENTMCNC: 33.7 G/DL (ref 31.5–35.7)
MCV RBC AUTO: 85.1 FL (ref 79–97)
MONOCYTES # BLD AUTO: 0.45 10*3/MM3 (ref 0.1–0.9)
MONOCYTES NFR BLD AUTO: 7.6 % (ref 5–12)
NEUTROPHILS NFR BLD AUTO: 3.59 10*3/MM3 (ref 1.7–7)
NEUTROPHILS NFR BLD AUTO: 60.2 % (ref 42.7–76)
NRBC BLD AUTO-RTO: 0 /100 WBC (ref 0–0.2)
PLATELET # BLD AUTO: 262 10*3/MM3 (ref 140–450)
PMV BLD AUTO: 10 FL (ref 6–12)
POTASSIUM SERPL-SCNC: 4 MMOL/L (ref 3.5–5.2)
PROT SERPL-MCNC: 7.3 G/DL (ref 6–8.5)
RBC # BLD AUTO: 4.78 10*6/MM3 (ref 3.77–5.28)
SODIUM SERPL-SCNC: 139 MMOL/L (ref 136–145)
TSH SERPL DL<=0.05 MIU/L-ACNC: 1.45 UIU/ML (ref 0.27–4.2)
WBC NRBC COR # BLD AUTO: 5.95 10*3/MM3 (ref 3.4–10.8)

## 2025-04-16 PROCEDURE — 80050 GENERAL HEALTH PANEL: CPT

## 2025-04-16 PROCEDURE — 36415 COLL VENOUS BLD VENIPUNCTURE: CPT

## 2025-04-16 PROCEDURE — 84702 CHORIONIC GONADOTROPIN TEST: CPT

## 2025-04-18 LAB — HCG INTACT+B SERPL-ACNC: <1 MIU/ML

## 2025-07-20 ENCOUNTER — DOCUMENTATION (OUTPATIENT)
Dept: FAMILY MEDICINE CLINIC | Facility: CLINIC | Age: 30
End: 2025-07-20
Payer: COMMERCIAL

## 2025-07-20 RX ORDER — ERYTHROMYCIN 5 MG/G
OINTMENT OPHTHALMIC NIGHTLY
Qty: 1 G | Refills: 0 | Status: SHIPPED | OUTPATIENT
Start: 2025-07-20